# Patient Record
Sex: FEMALE | Race: WHITE | Employment: UNEMPLOYED | URBAN - METROPOLITAN AREA
[De-identification: names, ages, dates, MRNs, and addresses within clinical notes are randomized per-mention and may not be internally consistent; named-entity substitution may affect disease eponyms.]

---

## 2021-05-26 PROBLEM — N93.9 ABNORMAL UTERINE BLEEDING DUE TO ENDOMETRIAL POLYP: Status: ACTIVE | Noted: 2021-05-26

## 2021-05-26 PROBLEM — N84.0 ABNORMAL UTERINE BLEEDING DUE TO ENDOMETRIAL POLYP: Status: ACTIVE | Noted: 2021-05-26

## 2021-12-20 PROBLEM — L72.3 SEBACEOUS CYST: Status: ACTIVE | Noted: 2021-12-20

## 2023-07-31 ENCOUNTER — APPOINTMENT (OUTPATIENT)
Dept: GENERAL RADIOLOGY | Age: 58
End: 2023-07-31
Payer: MEDICAID

## 2023-07-31 ENCOUNTER — HOSPITAL ENCOUNTER (EMERGENCY)
Age: 58
Discharge: HOME | End: 2023-08-01
Attending: STUDENT IN AN ORGANIZED HEALTH CARE EDUCATION/TRAINING PROGRAM
Payer: MEDICAID

## 2023-07-31 VITALS
OXYGEN SATURATION: 97 % | WEIGHT: 163 LBS | SYSTOLIC BLOOD PRESSURE: 148 MMHG | HEIGHT: 66 IN | TEMPERATURE: 98.1 F | BODY MASS INDEX: 26.2 KG/M2 | DIASTOLIC BLOOD PRESSURE: 82 MMHG | HEART RATE: 98 BPM | RESPIRATION RATE: 14 BRPM

## 2023-07-31 DIAGNOSIS — S82.122A CLOSED FRACTURE OF LATERAL PORTION OF LEFT TIBIAL PLATEAU, INITIAL ENCOUNTER: Primary | ICD-10-CM

## 2023-07-31 PROCEDURE — 6360000002 HC RX W HCPCS: Performed by: STUDENT IN AN ORGANIZED HEALTH CARE EDUCATION/TRAINING PROGRAM

## 2023-07-31 PROCEDURE — 99284 EMERGENCY DEPT VISIT MOD MDM: CPT

## 2023-07-31 PROCEDURE — 96372 THER/PROPH/DIAG INJ SC/IM: CPT

## 2023-07-31 PROCEDURE — 73562 X-RAY EXAM OF KNEE 3: CPT

## 2023-07-31 RX ORDER — KETOROLAC TROMETHAMINE 30 MG/ML
15 INJECTION, SOLUTION INTRAMUSCULAR; INTRAVENOUS ONCE
Status: COMPLETED | OUTPATIENT
Start: 2023-07-31 | End: 2023-07-31

## 2023-07-31 RX ADMIN — KETOROLAC TROMETHAMINE 15 MG: 30 INJECTION, SOLUTION INTRAMUSCULAR; INTRAVENOUS at 23:01

## 2023-07-31 ASSESSMENT — PAIN DESCRIPTION - LOCATION: LOCATION: KNEE

## 2023-07-31 ASSESSMENT — PAIN - FUNCTIONAL ASSESSMENT
PAIN_FUNCTIONAL_ASSESSMENT: PREVENTS OR INTERFERES SOME ACTIVE ACTIVITIES AND ADLS
PAIN_FUNCTIONAL_ASSESSMENT: 0-10

## 2023-07-31 ASSESSMENT — PAIN DESCRIPTION - ORIENTATION: ORIENTATION: LEFT

## 2023-07-31 ASSESSMENT — PAIN DESCRIPTION - PAIN TYPE: TYPE: ACUTE PAIN

## 2023-07-31 ASSESSMENT — PAIN SCALES - GENERAL
PAINLEVEL_OUTOF10: 6
PAINLEVEL_OUTOF10: 6

## 2023-07-31 ASSESSMENT — PAIN DESCRIPTION - ONSET: ONSET: SUDDEN

## 2023-07-31 ASSESSMENT — PAIN DESCRIPTION - FREQUENCY: FREQUENCY: CONTINUOUS

## 2023-08-01 NOTE — ED PROVIDER NOTES
Course as of 08/01/23 0003   Mon Jul 31, 2023   6347 Patient is reassessed she remains medically stable. Her pain is improved medications given here. Her x-ray reveals a lateral plateau fracture that appears nondisplaced. Given this finding she is placed in a knee immobilizer instructed on crutches use and directed to be nonweightbearing until she can obtain prompt outpatient follow-up to orthopedics in the next 1 to 2 days. She is instructed on conservative care at home regarding findings of the splint and control and rest ice compression elevation. She is agreeable to this plan and this concludes her ED course [NG]      ED Course User Index  [NG] Alfred Rangel MD       Additional Reassessment    Is this patient to be included in the SEP-1 core measure? No Exclusion criteria - the patient is NOT to be included for SEP-1 Core Measure due to: Infection is not suspected    Medical Decision Making  Fall with left knee injury with nondisplaced lateral tibial plateau fracture placed in knee immobilizer instructed to be nonweightbearing with prompt outpatient follow to orthopedics    Amount and/or Complexity of Data Reviewed  Radiology: ordered. Decision-making details documented in ED Course. Risk  Prescription drug management. Decision regarding hospitalization. The patient was given the followingmedications:  Orders Placed This Encounter   Medications    ketorolac (TORADOL) injection 15 mg       CONSULTS:  None      CRITICAL CARE TIME   None    Clinical Impression     1.  Closed fracture of lateral portion of left tibial plateau, initial encounter        Disposition     PATIENT REFERRED TO:  [address] [place] [address]  Schedule an appointment as soon as possible for a visit in 2 days        DISCHARGE MEDICATIONS:  New Prescriptions    No medications on file       DISPOSITION Decision To Discharge 07/31/2023 10:44:40 PM      Ashley Saha MD

## 2023-08-01 NOTE — DISCHARGE INSTRUCTIONS
You were evaluated in the emergency department for left knee injury. Assessments and testing completed during your visit were reassuring and at this time there is no indication for further testing, treatment or admission to the hospital. Given this it is appropriate to discharge you from the emergency department. At the time of discharge we discussed the following: You have a fracture of a portion of your tibia which is one of the lower leg bones at the level of the knee. This injury may require surgery. You must keep the knee in the splint and place no weight on the injured leg until you visit with the orthopedic surgeons with the referral included here. Please use the crutches to assist getting around so as avoid placing any weight on the injury. You may use the over-the-counter pain reliever of choice including Tylenol or ibuprofen and as we discussed I recommend you elevate the injury apply ice and again leave the splint in place until evaluated by orthopedics. Please note that sometimes it is difficult to diagnose a medical condition early in the disease process before the disease is fully manifest. Because of this, should you develop any new or worsening symptoms, you may return at any time to the emergency department for another evaluation. If available you are also recommended to review this visit with your primary care physician or other medical provider in the next 7 days. Thank you for allowing us to care for you today.

## 2023-08-02 ENCOUNTER — TELEPHONE (OUTPATIENT)
Dept: ORTHOPEDIC SURGERY | Age: 58
End: 2023-08-02

## 2023-08-02 ENCOUNTER — ANESTHESIA EVENT (OUTPATIENT)
Dept: OPERATING ROOM | Age: 58
End: 2023-08-02
Payer: MEDICAID

## 2023-08-02 ENCOUNTER — OFFICE VISIT (OUTPATIENT)
Dept: ORTHOPEDIC SURGERY | Age: 58
End: 2023-08-02

## 2023-08-02 VITALS — BODY MASS INDEX: 26.2 KG/M2 | WEIGHT: 163 LBS | HEIGHT: 66 IN

## 2023-08-02 DIAGNOSIS — M25.562 ACUTE PAIN OF LEFT KNEE: ICD-10-CM

## 2023-08-02 DIAGNOSIS — S82.142A CLOSED FRACTURE OF LEFT TIBIAL PLATEAU, INITIAL ENCOUNTER: Primary | ICD-10-CM

## 2023-08-02 NOTE — TELEPHONE ENCOUNTER
General Question     Subject: LEFT KNEE       Patient and /or Facility Request: Jacques Arciniega  Contact Number: [phone]    PATIENT CALLING REQUESTING A CALL BACK FROM SOMEONE IN DOCTOR SHARIF'S OFFICE REGARDING HER LEFT KNEE SURGERY       PLEASE CALL THE PATIENT BACK AT THE ABOVE NUMBER

## 2023-08-02 NOTE — H&P
approximately 10 weeks to full weightbearing as tolerated. She was transitioned to a T scope hinged knee brace locked in extension for the time being.     Ping Josue MD

## 2023-08-02 NOTE — PROGRESS NOTES
1.  Do not eat or drink anything after 12 midnight prior to surgery. This includes no water, chewing gum or mints. 2.  Take the following pills with a small sip of water on the morning of surgery. 3.  Aspirin, Ibuprofen, Advil, Naproxen, Vitamin E and other Anti-inflammatory products should be stopped for 5 days before surgery or as directed by your physician. 4.  Check with your doctor regarding stopping Plavix, Coumadin, Lovenox, Fragmin or other blood thinners. 5.  Do not smoke and do not drink alcoholic beverages 24 hours prior to surgery. This includes NA Beer. 6.  You may brush your teeth and gargle the morning of surgery. DO NOT SWALLOW WATER.  7.  You MUST make arrangements for a responsible adult to take you home after your surgery. You will not be allowed to leave alone or drive yourself home. It is strongly suggested someone stay with you the first 24 hours. Your surgery will be cancelled if you do not have a ride home. 8.  A parent/legal guardian must accompany a child scheduled for surgery and plan to stay at the hospital until the child is discharged. Please do not bring other children with you. 9.  Please wear simple, loose fitting clothing to the hospital.  [place] not bring valuables ( money, credit cards, checkbooks, etc.)  Do not wear any makeup (including no eye makeup) or nail polish on your fingers or toes. 10.  Do not wear any jewelry or piercing on the day of surgery. All body piercing jewelry must be removed. 11.  If you have dentures, they will be removed before going to the OR; we will provide you a container. If you wear contact lenses or glasses, they will be removed; please bring a case for them. 12.  Please see your family doctor/pediatrician for a history & physical and/or concerning medications. Bring any test results/reports from your physician's office the day of surgery. 15.  Remember to bring Blood Bank Bracelet to the hospital on the day of surgery.   14.  If you have a Living Will and Durable Power of  for Healthcare, please bring in a copy. 13.  Notify your Surgeon if you develop any illness between now and surgery time; cough, cold, fever, sore throat, nausea, vomiting, etc.  Please notify your surgeon if you experience dizziness, shortness of breath or blurred vision between now and the time of your surgery. 16.  DO NOT shave your operative site 96 hours (4 days) prior to surgery. For face and neck surgery, men may use an electric razor 48 hours (2 days) prior to surgery. 17. Shower the night before surgery and the morning of surgery with  an antibacterial soap   or  Chlorhexidine gluconate (for total joint replacement). To provide excellent care, visitors will be limited to two in a room at any given time. Please no children under the age of 15 in the surgical department.

## 2023-08-02 NOTE — H&P (VIEW-ONLY)
ORTHOPAEDIC SURGERY INITIAL EVALUATION NOTE  Chief Complaint   Patient presents with    Knee Pain     L Tibial Plateau Fx      HISTORY OF PRESENT ILLNESS:  49-year-old female presents for evaluation of a left knee injury. She sustained a injury to the left knee after slipping on a toy at home. She describes the bending type moment to her left knee. She indicates that she was unable to walk and presented to the emergency department at [facility].  Her date of injury was 7/31/2023. She rates her pain 4 out of 10. She has been getting around with the knee immobilizer and crutches. She has significant pain with movement. She denies numbness or tingling. She indicates there was minimal osteoarthritis pain involving the knee previous to the injury. 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

## 2023-08-03 ENCOUNTER — ANESTHESIA (OUTPATIENT)
Dept: OPERATING ROOM | Age: 58
End: 2023-08-03
Payer: MEDICAID

## 2023-08-03 ENCOUNTER — HOSPITAL ENCOUNTER (OUTPATIENT)
Age: 58
Setting detail: OUTPATIENT SURGERY
Discharge: HOME | End: 2023-08-03
Attending: ORTHOPAEDIC SURGERY | Admitting: ORTHOPAEDIC SURGERY
Payer: MEDICAID

## 2023-08-03 VITALS
DIASTOLIC BLOOD PRESSURE: 85 MMHG | WEIGHT: 163 LBS | HEIGHT: 66 IN | TEMPERATURE: 97 F | RESPIRATION RATE: 16 BRPM | BODY MASS INDEX: 26.2 KG/M2 | OXYGEN SATURATION: 97 % | HEART RATE: 91 BPM | SYSTOLIC BLOOD PRESSURE: 141 MMHG

## 2023-08-03 DIAGNOSIS — S82.142A CLOSED FRACTURE OF LEFT TIBIAL PLATEAU, INITIAL ENCOUNTER: Primary | ICD-10-CM

## 2023-08-03 PROCEDURE — 3600000012 HC SURGERY LEVEL 2 ADDTL 15MIN: Performed by: ORTHOPAEDIC SURGERY

## 2023-08-03 PROCEDURE — 6360000002 HC RX W HCPCS: Performed by: FAMILY MEDICINE

## 2023-08-03 PROCEDURE — 6370000000 HC RX 637 (ALT 250 FOR IP)

## 2023-08-03 PROCEDURE — 7100000000 HC PACU RECOVERY - FIRST 15 MIN: Performed by: ORTHOPAEDIC SURGERY

## 2023-08-03 PROCEDURE — 3700000000 HC ANESTHESIA ATTENDED CARE: Performed by: ORTHOPAEDIC SURGERY

## 2023-08-03 PROCEDURE — 3700000001 HC ADD 15 MINUTES (ANESTHESIA): Performed by: ORTHOPAEDIC SURGERY

## 2023-08-03 PROCEDURE — 2720000010 HC SURG SUPPLY STERILE: Performed by: ORTHOPAEDIC SURGERY

## 2023-08-03 PROCEDURE — C9399 UNCLASSIFIED DRUGS OR BIOLOG: HCPCS | Performed by: NURSE ANESTHETIST, CERTIFIED REGISTERED

## 2023-08-03 PROCEDURE — 7100000011 HC PHASE II RECOVERY - ADDTL 15 MIN: Performed by: ORTHOPAEDIC SURGERY

## 2023-08-03 PROCEDURE — 2500000003 HC RX 250 WO HCPCS: Performed by: FAMILY MEDICINE

## 2023-08-03 PROCEDURE — 2580000003 HC RX 258: Performed by: ANESTHESIOLOGY

## 2023-08-03 PROCEDURE — 2709999900 HC NON-CHARGEABLE SUPPLY: Performed by: ORTHOPAEDIC SURGERY

## 2023-08-03 PROCEDURE — 6360000002 HC RX W HCPCS: Performed by: NURSE ANESTHETIST, CERTIFIED REGISTERED

## 2023-08-03 PROCEDURE — 3600000002 HC SURGERY LEVEL 2 BASE: Performed by: ORTHOPAEDIC SURGERY

## 2023-08-03 PROCEDURE — 7100000001 HC PACU RECOVERY - ADDTL 15 MIN: Performed by: ORTHOPAEDIC SURGERY

## 2023-08-03 PROCEDURE — 6360000002 HC RX W HCPCS: Performed by: ORTHOPAEDIC SURGERY

## 2023-08-03 PROCEDURE — C1713 ANCHOR/SCREW BN/BN,TIS/BN: HCPCS | Performed by: ORTHOPAEDIC SURGERY

## 2023-08-03 PROCEDURE — 7100000010 HC PHASE II RECOVERY - FIRST 15 MIN: Performed by: ORTHOPAEDIC SURGERY

## 2023-08-03 DEVICE — IMPLANTABLE DEVICE: Type: IMPLANTABLE DEVICE | Site: TIBIA | Status: FUNCTIONAL

## 2023-08-03 RX ORDER — SODIUM CHLORIDE, SODIUM LACTATE, POTASSIUM CHLORIDE, CALCIUM CHLORIDE 600; 310; 30; 20 MG/100ML; MG/100ML; MG/100ML; MG/100ML
INJECTION, SOLUTION INTRAVENOUS CONTINUOUS
Status: DISCONTINUED | OUTPATIENT
Start: 2023-08-03 | End: 2023-08-03 | Stop reason: HOSPADM

## 2023-08-03 RX ORDER — ONDANSETRON 2 MG/ML
4 INJECTION INTRAMUSCULAR; INTRAVENOUS
Status: DISCONTINUED | OUTPATIENT
Start: 2023-08-03 | End: 2023-08-03 | Stop reason: HOSPADM

## 2023-08-03 RX ORDER — ROCURONIUM BROMIDE 10 MG/ML
INJECTION, SOLUTION INTRAVENOUS PRN
Status: DISCONTINUED | OUTPATIENT
Start: 2023-08-03 | End: 2023-08-03

## 2023-08-03 RX ORDER — ROCURONIUM BROMIDE 10 MG/ML
INJECTION, SOLUTION INTRAVENOUS PRN
Status: DISCONTINUED | OUTPATIENT
Start: 2023-08-03 | End: 2023-08-03 | Stop reason: SDUPTHER

## 2023-08-03 RX ORDER — SODIUM CHLORIDE 0.9 % (FLUSH) 0.9 %
5-40 SYRINGE (ML) INJECTION EVERY 12 HOURS SCHEDULED
Status: DISCONTINUED | OUTPATIENT
Start: 2023-08-03 | End: 2023-08-03 | Stop reason: HOSPADM

## 2023-08-03 RX ORDER — FENTANYL CITRATE 50 UG/ML
INJECTION, SOLUTION INTRAMUSCULAR; INTRAVENOUS PRN
Status: DISCONTINUED | OUTPATIENT
Start: 2023-08-03 | End: 2023-08-03 | Stop reason: SDUPTHER

## 2023-08-03 RX ORDER — OXYCODONE HYDROCHLORIDE AND ACETAMINOPHEN 5; 325 MG/1; MG/1
1 TABLET ORAL EVERY 6 HOURS PRN
Qty: 28 TABLET | Refills: 0 | Status: SHIPPED | OUTPATIENT
Start: 2023-08-03 | End: 2023-08-10

## 2023-08-03 RX ORDER — ONDANSETRON 2 MG/ML
INJECTION INTRAMUSCULAR; INTRAVENOUS PRN
Status: DISCONTINUED | OUTPATIENT
Start: 2023-08-03 | End: 2023-08-03 | Stop reason: SDUPTHER

## 2023-08-03 RX ORDER — DIPHENHYDRAMINE HYDROCHLORIDE 50 MG/ML
12.5 INJECTION INTRAMUSCULAR; INTRAVENOUS
Status: DISCONTINUED | OUTPATIENT
Start: 2023-08-03 | End: 2023-08-03 | Stop reason: HOSPADM

## 2023-08-03 RX ORDER — BUPIVACAINE HYDROCHLORIDE 5 MG/ML
INJECTION, SOLUTION PERINEURAL PRN
Status: DISCONTINUED | OUTPATIENT
Start: 2023-08-03 | End: 2023-08-03 | Stop reason: ALTCHOICE

## 2023-08-03 RX ORDER — CEFAZOLIN SODIUM 1 G/3ML
INJECTION, POWDER, FOR SOLUTION INTRAMUSCULAR; INTRAVENOUS PRN
Status: DISCONTINUED | OUTPATIENT
Start: 2023-08-03 | End: 2023-08-03 | Stop reason: SDUPTHER

## 2023-08-03 RX ORDER — CEFAZOLIN 2 G/1
INJECTION, POWDER, FOR SOLUTION INTRAMUSCULAR; INTRAVENOUS
Status: COMPLETED
Start: 2023-08-03 | End: 2023-08-03

## 2023-08-03 RX ORDER — DIPHENHYDRAMINE HYDROCHLORIDE 50 MG/ML
INJECTION INTRAMUSCULAR; INTRAVENOUS PRN
Status: DISCONTINUED | OUTPATIENT
Start: 2023-08-03 | End: 2023-08-03 | Stop reason: SDUPTHER

## 2023-08-03 RX ORDER — FENTANYL CITRATE 50 UG/ML
25 INJECTION, SOLUTION INTRAMUSCULAR; INTRAVENOUS EVERY 5 MIN PRN
Status: DISCONTINUED | OUTPATIENT
Start: 2023-08-03 | End: 2023-08-03 | Stop reason: HOSPADM

## 2023-08-03 RX ORDER — OXYCODONE HYDROCHLORIDE 5 MG/1
TABLET ORAL
Status: COMPLETED
Start: 2023-08-03 | End: 2023-08-03

## 2023-08-03 RX ORDER — IBUPROFEN 200 MG
200 TABLET ORAL EVERY 6 HOURS PRN
COMMUNITY

## 2023-08-03 RX ORDER — IPRATROPIUM BROMIDE AND ALBUTEROL SULFATE 2.5; .5 MG/3ML; MG/3ML
1 SOLUTION RESPIRATORY (INHALATION)
Status: DISCONTINUED | OUTPATIENT
Start: 2023-08-03 | End: 2023-08-03 | Stop reason: HOSPADM

## 2023-08-03 RX ORDER — SODIUM CHLORIDE 9 MG/ML
INJECTION, SOLUTION INTRAVENOUS PRN
Status: DISCONTINUED | OUTPATIENT
Start: 2023-08-03 | End: 2023-08-03 | Stop reason: HOSPADM

## 2023-08-03 RX ORDER — SODIUM CHLORIDE 9 MG/ML
INJECTION, SOLUTION INTRAVENOUS
Status: DISCONTINUED
Start: 2023-08-03 | End: 2023-08-03 | Stop reason: HOSPADM

## 2023-08-03 RX ORDER — LABETALOL HYDROCHLORIDE 5 MG/ML
10 INJECTION, SOLUTION INTRAVENOUS
Status: DISCONTINUED | OUTPATIENT
Start: 2023-08-03 | End: 2023-08-03 | Stop reason: HOSPADM

## 2023-08-03 RX ORDER — MEPERIDINE HYDROCHLORIDE 25 MG/ML
12.5 INJECTION INTRAMUSCULAR; INTRAVENOUS; SUBCUTANEOUS EVERY 5 MIN PRN
Status: DISCONTINUED | OUTPATIENT
Start: 2023-08-03 | End: 2023-08-03 | Stop reason: HOSPADM

## 2023-08-03 RX ORDER — OXYCODONE HYDROCHLORIDE AND ACETAMINOPHEN 5; 325 MG/1; MG/1
1 TABLET ORAL EVERY 4 HOURS PRN
Status: DISCONTINUED | OUTPATIENT
Start: 2023-08-03 | End: 2023-08-03 | Stop reason: HOSPADM

## 2023-08-03 RX ORDER — KETOROLAC TROMETHAMINE 30 MG/ML
INJECTION, SOLUTION INTRAMUSCULAR; INTRAVENOUS PRN
Status: DISCONTINUED | OUTPATIENT
Start: 2023-08-03 | End: 2023-08-03 | Stop reason: SDUPTHER

## 2023-08-03 RX ORDER — SODIUM CHLORIDE 0.9 % (FLUSH) 0.9 %
5-40 SYRINGE (ML) INJECTION PRN
Status: DISCONTINUED | OUTPATIENT
Start: 2023-08-03 | End: 2023-08-03 | Stop reason: HOSPADM

## 2023-08-03 RX ORDER — LIDOCAINE HYDROCHLORIDE 20 MG/ML
INJECTION, SOLUTION EPIDURAL; INFILTRATION; INTRACAUDAL; PERINEURAL PRN
Status: DISCONTINUED | OUTPATIENT
Start: 2023-08-03 | End: 2023-08-03 | Stop reason: SDUPTHER

## 2023-08-03 RX ORDER — MIDAZOLAM HYDROCHLORIDE 1 MG/ML
INJECTION INTRAMUSCULAR; INTRAVENOUS PRN
Status: DISCONTINUED | OUTPATIENT
Start: 2023-08-03 | End: 2023-08-03 | Stop reason: SDUPTHER

## 2023-08-03 RX ORDER — DEXAMETHASONE SODIUM PHOSPHATE 4 MG/ML
INJECTION, SOLUTION INTRA-ARTICULAR; INTRALESIONAL; INTRAMUSCULAR; INTRAVENOUS; SOFT TISSUE PRN
Status: DISCONTINUED | OUTPATIENT
Start: 2023-08-03 | End: 2023-08-03 | Stop reason: SDUPTHER

## 2023-08-03 RX ORDER — PROPOFOL 10 MG/ML
INJECTION, EMULSION INTRAVENOUS PRN
Status: DISCONTINUED | OUTPATIENT
Start: 2023-08-03 | End: 2023-08-03 | Stop reason: SDUPTHER

## 2023-08-03 RX ORDER — PROCHLORPERAZINE EDISYLATE 5 MG/ML
5 INJECTION INTRAMUSCULAR; INTRAVENOUS
Status: DISCONTINUED | OUTPATIENT
Start: 2023-08-03 | End: 2023-08-03 | Stop reason: HOSPADM

## 2023-08-03 RX ORDER — SUCCINYLCHOLINE CHLORIDE 20 MG/ML
INJECTION INTRAMUSCULAR; INTRAVENOUS PRN
Status: DISCONTINUED | OUTPATIENT
Start: 2023-08-03 | End: 2023-08-03 | Stop reason: SDUPTHER

## 2023-08-03 RX ORDER — BUPIVACAINE HYDROCHLORIDE 5 MG/ML
INJECTION, SOLUTION EPIDURAL; INTRACAUDAL
Status: DISCONTINUED
Start: 2023-08-03 | End: 2023-08-03 | Stop reason: HOSPADM

## 2023-08-03 RX ORDER — LORAZEPAM 2 MG/ML
0.5 INJECTION INTRAMUSCULAR
Status: DISCONTINUED | OUTPATIENT
Start: 2023-08-03 | End: 2023-08-03 | Stop reason: RX

## 2023-08-03 RX ADMIN — SUGAMMADEX 200 MG: 100 INJECTION, SOLUTION INTRAVENOUS at 11:50

## 2023-08-03 RX ADMIN — OXYCODONE HYDROCHLORIDE 5 MG: 5 TABLET ORAL at 12:53

## 2023-08-03 RX ADMIN — LIDOCAINE HYDROCHLORIDE 100 MG: 20 INJECTION, SOLUTION EPIDURAL; INFILTRATION; INTRACAUDAL; PERINEURAL at 10:19

## 2023-08-03 RX ADMIN — SODIUM CHLORIDE, POTASSIUM CHLORIDE, SODIUM LACTATE AND CALCIUM CHLORIDE: 600; 310; 30; 20 INJECTION, SOLUTION INTRAVENOUS at 08:37

## 2023-08-03 RX ADMIN — DIPHENHYDRAMINE HYDROCHLORIDE 25 MG: 50 INJECTION, SOLUTION INTRAMUSCULAR; INTRAVENOUS at 12:13

## 2023-08-03 RX ADMIN — SUCCINYLCHOLINE CHLORIDE 120 MG: 20 INJECTION, SOLUTION INTRAMUSCULAR; INTRAVENOUS at 10:23

## 2023-08-03 RX ADMIN — KETOROLAC TROMETHAMINE 30 MG: 30 INJECTION, SOLUTION INTRAMUSCULAR; INTRAVENOUS at 11:41

## 2023-08-03 RX ADMIN — FENTANYL CITRATE 50 MCG: 50 INJECTION INTRAMUSCULAR; INTRAVENOUS at 10:23

## 2023-08-03 RX ADMIN — PROPOFOL 150 MG: 10 INJECTION, EMULSION INTRAVENOUS at 10:22

## 2023-08-03 RX ADMIN — MIDAZOLAM 2 MG: 1 INJECTION INTRAMUSCULAR; INTRAVENOUS at 10:14

## 2023-08-03 RX ADMIN — ONDANSETRON 4 MG: 2 INJECTION INTRAMUSCULAR; INTRAVENOUS at 10:29

## 2023-08-03 RX ADMIN — ONDANSETRON 4 MG: 2 INJECTION INTRAMUSCULAR; INTRAVENOUS at 12:18

## 2023-08-03 RX ADMIN — DEXAMETHASONE SODIUM PHOSPHATE 4 MG: 4 INJECTION, SOLUTION INTRAMUSCULAR; INTRAVENOUS at 10:29

## 2023-08-03 RX ADMIN — CEFAZOLIN 2 G: 330 INJECTION, POWDER, FOR SOLUTION INTRAMUSCULAR; INTRAVENOUS at 10:14

## 2023-08-03 RX ADMIN — ROCURONIUM BROMIDE 50 MG: 10 INJECTION INTRAVENOUS at 10:29

## 2023-08-03 RX ADMIN — HYDROMORPHONE HYDROCHLORIDE 0.5 MG: 1 INJECTION, SOLUTION INTRAMUSCULAR; INTRAVENOUS; SUBCUTANEOUS at 10:40

## 2023-08-03 RX ADMIN — FENTANYL CITRATE 50 MCG: 50 INJECTION INTRAMUSCULAR; INTRAVENOUS at 10:29

## 2023-08-03 ASSESSMENT — PAIN SCALES - GENERAL
PAINLEVEL_OUTOF10: 2
PAINLEVEL_OUTOF10: 5
PAINLEVEL_OUTOF10: 5
PAINLEVEL_OUTOF10: 4

## 2023-08-03 ASSESSMENT — PAIN DESCRIPTION - LOCATION: LOCATION: LEG

## 2023-08-03 ASSESSMENT — PAIN DESCRIPTION - DESCRIPTORS
DESCRIPTORS: ACHING
DESCRIPTORS: DULL

## 2023-08-03 ASSESSMENT — PAIN DESCRIPTION - ORIENTATION: ORIENTATION: LEFT

## 2023-08-03 NOTE — PROGRESS NOTES
Pt states she is still having some dizziness. Also having pain rated 5-7. She states it is hard to number. She does not want IV pain med due to it affecting her brain. She will take oral pain meds. Pt is tolerating drinking fluids and eating crackers.

## 2023-08-03 NOTE — DISCHARGE INSTRUCTIONS
Discharge instructions  No weightbearing on the left leg. Use walker/crutches to assist.  Use pain as a guide to activity. Hinged knee brace locked in extension with exception of skin rest and hygiene. Knee brace locked straight in full extension for the first 2 weeks. Use ice to limit swelling. Take pain medications as prescribed. Leave dressing in place x 7 days postop. Apply new mepilex dressing at that time. May change sooner if saturation or moisture. May shower with dressing in place. Allow water to run over dressing. No tub baths. Do not submerge. Recommend taking a aspirin 81mg twice daily for 6 weeks to prevent blood clots. Look out for worsening pain, increasing redness, fevers/chills, numbness, chest pain, shortness of breath. Call the office at [phone] if you have questions/concerns. Followup in 2 weeks for wound check. ANESTHESIA DISCHARGE INSTRUCTIONS    You are under the influence of drugs- do not drink alcohol, drive a car, operate machinery(such as power tools, kitchen appliances, etc), sign legal documents, or make any important decisions for 24 hours (or while on pain medications). Children should not ride bikes or [place] or play on gym sets  for 24 hours after surgery. A responsible adult should be with you for 24 hours. Rest at home today- increase activity as tolerated. Progress slowly to a regular diet unless your physician has instructed you otherwise. Drink plenty of water. CALL YOUR DOCTOR IF YOU:  Have moderate to severe nausea or vomiting AND are unable to hold down fluids or prescribed medications. Have bright red bloody drainage from your dressing that won't stop oozing.   Do not get relief with your pain medication    NORMAL (POSSIBLE) SIDE EFFECTS FROM ANESTHESIA:     Confusion, temporary memory loss, delayed reaction times in the first 24 hours  Lightheadedness, dizziness, difficulty focusing, blurred vision  Nausea/vomiting can happen  Shivering,

## 2023-08-03 NOTE — INTERVAL H&P NOTE
Update History & Physical    The patient's History and Physical of August 2, 2023 was reviewed with the patient and I examined the patient. There was no change. The surgical site was confirmed by the patient and me. Plan: The risks, benefits, expected outcome, and alternative to the recommended procedure have been discussed with the patient. Patient understands and wants to proceed with the procedure.      Electronically signed by Radha Bingham MD on 8/3/2023 at 10:08 AM

## 2023-08-03 NOTE — PROGRESS NOTES
Pt is tolerating ice chips. She said her head is dizzy. Hard to focus right now. Also she has some pain. Nurse will not medicate with Dilaudid yet due to patient saying her her is dizzy. Pt is tolerating the pain and will wait a bit for pain med. Cold washcloth placed on forehead.

## 2023-08-03 NOTE — OP NOTE
Operative Note      Patient: Salome Painting  YOB: 1965  MRN: [id number]    Date of Procedure: 8/3/2023    Pre-Op Diagnosis Codes:     * Fracture, tibial plateau, left, closed, initial encounter [S82.142A]    Post-Op Diagnosis: Same       Procedure(s):  OPEN REDUCTION INTERNAL FIXATION OF LEFT TIBIAL PLATEAU FRACTURE    Surgeon(s):  Cass Cody MD    Assistant:   First Assistant: Bipin Anglin    Anesthesia: General    Estimated Blood Loss (mL): less than 580     Complications: None    Specimens:   * No specimens in log *    Implants:  Implant Name Type Inv. Item Serial No.  Lot No. LRB No. Used Action   GRAFT SYNTH TISS PTTY 2.5 CC MOLD KT GAMMA-BSM - [id number]  GRAFT SYNTH TISS PTTY 2.5 CC MOLD KT GAMMA-BSM  [employer]DIA [employer] TRAUMA-WD [id number] Left 2 Implanted   PLATE BONE 3 H LT PROX TIB HELENE STD CRV LCK ALPS - [id number]  PLATE BONE 3 H LT PROX TIB HELENE STD CRV LCK ALPS  [employer]DIA [employer] TRAU[employer]-WD  Left 1 Implanted   SCREW BNE L60MM DIA3. 5MM STD PATTY DST TIB TI ST YULISSA FULL - [id number]  SCREW BNE L60MM DIA3. 5MM STD PATTY DST TIB TI ST YULISSA FULL  NDAIA [employer]OMET TRAUMA-WD  Left 3 Implanted   SCREW BNE L65MM DIA3. 5MM STD PATTY DST TIB TI ST YULISSA FULL - [id number]  SCREW BNE L65MM DIA3. 5MM STD PATTY DST TIB TI ST YULISSA FULL  NADIA [employer]OMET TRAUMA-WD  Left 2 Implanted   SCREW BNE L50MM DIA3. 5MM STD PATTY TI ST NONCANNULATED FULL - [id number]  SCREW BNE L50MM DIA3. 5MM STD PATTY TI ST NONCANNULATED FULL  NADIA BIOMET TRAUMA-WD  Left 1 Implanted   SCREW BNE L46MM DIA3.5MM PATTY ST NONCANNULATED NONLOCKING - [id number]  SCREW BNE L46MM DIA3.5MM PATTY ST NONCANNULATED NONLOCKING  NADIA BIOMET TRAUMA-WD  Left 1 Implanted     Tourniquet time: Less than 1 hour at 300 mmHg on the left side.     Drains: * No LDAs found *    Findings: Significant subsidence of dye punch type lateral tibial plateau osteochondral fragments    Detailed Description of Procedure:   Patient was positively identified in the holding area by

## 2023-08-03 NOTE — ANESTHESIA POSTPROCEDURE EVALUATION
Department of Anesthesiology  Postprocedure Note    Patient: Anjelica Veloz  MRN: [id number]  YOB: 1965  Date of evaluation: 8/3/2023      Procedure Summary     Date: 08/03/23 Room / Location: [address] [place] [address] OR 01 / [facility]    Anesthesia Start: 8360 Anesthesia Stop: 6541    Procedure: OPEN REDUCTION INTERNAL FIXATION OF LEFT TIBIAL PLATEAU FRACTURE (Left: Leg Lower) Diagnosis:       Fracture, tibial plateau, left, closed, initial encounter      (Fracture, tibial plateau, left, closed, initial encounter [R39.888L])    Surgeons: Yolanda Simons MD Responsible Provider: Alea Cardenas MD    Anesthesia Type: general ASA Status: 2          Anesthesia Type: No value filed.     Nellie Phase I: Nellie Score: 10    Nellie Phase II:        Anesthesia Post Evaluation    Patient location during evaluation: PACU  Level of consciousness: awake  Complications: no  Multimodal analgesia pain management approach

## 2023-08-03 NOTE — ADDENDUM NOTE
Addendum  created 08/03/23 1307 by Liyah Quiroga MD    Order list changed, Pharmacy for encounter modified

## 2023-08-03 NOTE — PROGRESS NOTES
Discharge  instructions reviewed. Pt and family verbalize understanding with no further questions. VSS. Pt discharged via [facility] to car. Assessment unchanged.

## 2023-08-03 NOTE — PROGRESS NOTES
Pt is awake and alert. States she is nauseated. SHREYA Parada will get the Zofran to give her. Pt is asking for ice chips.

## 2023-08-10 ENCOUNTER — TELEPHONE (OUTPATIENT)
Dept: ORTHOPEDIC SURGERY | Age: 58
End: 2023-08-10

## 2023-08-10 NOTE — TELEPHONE ENCOUNTER
Other PATIENT CALLED STATES THAT SHE NEEDS TO SPEAK WITH SOMEONE TO CLARIFY HER INSTRUCTIONS FOR POST OP SHOWERING.  PLS CALL TO [facility] [phone]

## 2023-08-10 NOTE — TELEPHONE ENCOUNTER
Spoke with patient. She wanted to clarify that the Meplilex could get wet. I informed her that it is waterproof and reminded her not to submerge her leg at this point. She will see us next Wednesday for a post-op check.

## 2023-08-16 ENCOUNTER — OFFICE VISIT (OUTPATIENT)
Dept: ORTHOPEDIC SURGERY | Age: 58
End: 2023-08-16

## 2023-08-16 VITALS — HEIGHT: 66 IN | WEIGHT: 163 LBS | BODY MASS INDEX: 26.2 KG/M2

## 2023-08-16 DIAGNOSIS — Z47.89 ORTHOPEDIC AFTERCARE: Primary | ICD-10-CM

## 2023-08-16 PROCEDURE — 99024 POSTOP FOLLOW-UP VISIT: CPT | Performed by: ORTHOPAEDIC SURGERY

## 2023-08-17 NOTE — PROGRESS NOTES
ORTHOPAEDIC SURGERY FOLLOWUP VISIT    CHIEF COMPLAINT: Left knee:    DATE OF INJURY: 8/1/2023  DIAGNOSIS: Left lateral tibial plateau fracture  DATE OF SURGERY: 8/3/2023, ORIF left lateral tibial plateau fracture    HISTORY OF PRESENT ILLNESS:  66-year-old female presents POD #13 status post ORIF left lateral tibial plateau fracture. This was a die punch type pattern. This was treated by elevating the articular surface and calcium phosphate cement support. Overall, she is doing extremely well. She rates her pain 0 out of 10 today. No issues with wound healing. Denies fever, chills, night sweats. Has not had any wound drainage. She has been compliant with nonweightbearing restrictions and use of her hinged knee brace. She has weaned completely from narcotic pain medications. PHYSICAL EXAM:  General: Well-appearing. No distress. Left lower extremity: Incisional sites are pristinely approximated with Prineo mesh. No signs of dehiscence. No open areas. No surrounding erythema. Prineo mesh was removed today. Underlying incisions are healing well. Steri-Strips applied. Knee range of motion gently assessed is full extension to 60 degrees of flexion related to the anterior knee swelling and stiffness. There is moderate anterior knee swelling. No significant effusion or hemarthrosis. Ligamentous stress exam was not carried out today. Sensation is intact to light touch in deep peroneal, superficial peroneal, tibial, sural, and saphenous nerve distributions. Motor function is intact to EHL, FHL, tibialis anterior, and gastroc. There is brisk capillary refill to the toes and a strong palpable dorsalis pedis pulse. Compartments are soft and compressible. There is no calf tenderness and a negative Homans' sign. RADIOGRAPHIC EXAM:  3 views of the left knee including AP, tunnel, lateral x-rays demonstrate internal fixation hardware in excellent position.   Congruence of the lateral joint space is

## 2023-08-23 ENCOUNTER — HOSPITAL ENCOUNTER (OUTPATIENT)
Dept: PHYSICAL THERAPY | Age: 58
Setting detail: THERAPIES SERIES
Discharge: HOME | End: 2023-08-23
Payer: COMMERCIAL

## 2023-08-23 DIAGNOSIS — R60.1 GENERALIZED EDEMA: ICD-10-CM

## 2023-08-23 DIAGNOSIS — M25.662 KNEE STIFFNESS, LEFT: Primary | ICD-10-CM

## 2023-08-23 DIAGNOSIS — R29.898 LEFT LEG WEAKNESS: ICD-10-CM

## 2023-08-23 DIAGNOSIS — R26.2 DIFFICULTY WALKING: ICD-10-CM

## 2023-08-23 PROCEDURE — 97161 PT EVAL LOW COMPLEX 20 MIN: CPT | Performed by: PHYSICAL THERAPIST

## 2023-08-23 PROCEDURE — 97016 VASOPNEUMATIC DEVICE THERAPY: CPT | Performed by: PHYSICAL THERAPIST

## 2023-08-23 PROCEDURE — 97110 THERAPEUTIC EXERCISES: CPT | Performed by: PHYSICAL THERAPIST

## 2023-08-23 NOTE — FLOWSHEET NOTE
weeks  Independent in HEP and progression per patient tolerance, in order to progress toward full function and prevent re-injury. Status:  [] Progressing: [] Met: [] Not Met: [] Adjusted  Patient will have a decrease in pain to 1/10 to help  facilitate improvement in movement, function, and ADLs as indicated by functional deficits. Status:  [] Progressing: [] Met: [] Not Met: [] Adjusted    Long Term Goals: To be achieved in: 8-10 weeks  Disability index score of 50% or less for the LEFS to assist with return top prior level of function. Status:  [] Progressing: [] Met: [] Not Met: [] Adjusted  Improve knee flexion PROM to 120-130 degrees or better to allow for proper joint functioning as indicated by patients functional deficits. Status:  [] Progressing: [] Met: [] Not Met: [] Adjusted  Pt to improve strength to 4+/5 or better of proximal hip, quadriceps, and hamstringsto allow for proper muscle and joint use in functional mobility, ADLs and prior level of function  Status:  [] Progressing: [] Met: [] Not Met: [] Adjusted  Patient will return to up/down 1 flight of stairs without increased symptoms or restriction to work towards return to prior level of function. Status:  [] Progressing: [] Met: [] Not Met: [] Adjusted  Patient will be able to manage symptoms while resuming full duty at work. Status:  [] Progressing: [] Met: [] Not Met: [] Adjusted        Overall Progression Towards Functional goals/ Treatment Progress Update:  [] Patient is progressing as expected towards functional goals listed. [] Progression is slowed due to complexities/Impairments listed. [] Progression has been slowed due to co-morbidities.   [x] Plan just implemented, too soon (<30days) to assess goals progression   [] Goals require adjustment due to lack of progress  [] Patient is not progressing as expected and requires additional follow up with physician  [] Other:     CHARGE CAPTURE     CHARGE GRID   CPT

## 2023-09-01 ENCOUNTER — HOSPITAL ENCOUNTER (OUTPATIENT)
Dept: PHYSICAL THERAPY | Age: 58
Setting detail: THERAPIES SERIES
Discharge: HOME | End: 2023-09-01
Payer: COMMERCIAL

## 2023-09-01 PROCEDURE — 97016 VASOPNEUMATIC DEVICE THERAPY: CPT | Performed by: PHYSICAL THERAPIST

## 2023-09-01 PROCEDURE — 97110 THERAPEUTIC EXERCISES: CPT | Performed by: PHYSICAL THERAPIST

## 2023-09-01 NOTE — FLOWSHEET NOTE
coordination, kinesthetic sense, posture, and/or proprioception for sitting and/or standing activities  (20513) MANUAL THERAPY-  Manual therapy techniques, 1 or more regions, each 15 minutes (Mobilization/manipulation, manual lymphatic drainage, manual traction) for the purpose of modulating pain, promoting relaxation,  increasing ROM, reducing/eliminating soft tissue swelling/inflammation/restriction, improving soft tissue extensibility and allowing for proper ROM for normal function with self care, mobility, lifting and ambulation  (16454) VASOPNEUMATIC    TREATMENT PLAN   Plan: Progress hip/core exercises as tolerated. Cont NWB status L LE. Electronically Signed by Quentin Rico, ISAAC  Date: 09/01/2023     Note: If patient does not return for scheduled/recommended follow up visits, this note will serve as a discharge from care along with the most recent update on progress.

## 2023-09-06 ENCOUNTER — HOSPITAL ENCOUNTER (OUTPATIENT)
Dept: PHYSICAL THERAPY | Age: 58
Setting detail: THERAPIES SERIES
Discharge: HOME | End: 2023-09-06
Payer: COMMERCIAL

## 2023-09-06 PROCEDURE — 97016 VASOPNEUMATIC DEVICE THERAPY: CPT | Performed by: PHYSICAL THERAPIST

## 2023-09-06 PROCEDURE — 97110 THERAPEUTIC EXERCISES: CPT | Performed by: PHYSICAL THERAPIST

## 2023-09-06 NOTE — FLOWSHEET NOTE
[address] [facility]  [address] office: [phone] fax: [phone]      Physical Therapy: TREATMENT/PROGRESS NOTE   Patient: Arun Madsen (87 y.o. female)   Treatment Date: 2023   :  1965 MRN: [id number]   Visit #: 3    Insurance: Payor: Marleen [place] / Plan: [place] / Product Type: *No Product type* /   Insurance ID: [id number] - (Medicaid Managed)  Secondary Insurance (if applicable):    Treatment Diagnosis:        Knee stiffness, left  M25.662          2. Left leg weakness  R29.898         3. Difficulty walking  R26.2         4. Generalized edema  R60.1        Medical Diagnosis:       Orthopedic aftercare [Z47.89]   Referring Physician: Hiram Guerra MD  PCP: Oleksandr Salas                             Plan of care signed (Y/N):     Date of Patient follow up with Physician: 23     Progress Report: EVAL today    Progress report due (10 Rx/or 30 days whichever is less):      Recertification due (POC duration/ or 90 days whichever is less): 23     Visit # Insurance Allowable Auth Needed   3 30 [x]Yes  after 30   []No     Latex Allergy:  [x]NO      []YES  Preferred Language for Healthcare:   [x]English       []other:    SUBJECTIVE EXAMINATION     Patient Report/Comments: Reports feeling stiffness, better than last time. Using walker at home and compliant with NWB status. No new issues to report. Simran STYLES next week.      OBJECTIVE EXAMINATION     Observation:     Test measurements: see eval     Test used Initial score 2023   Pain Summary VAS 2/10 1/10   Functional questionnaire LEFS 88%    ROM       Resting ext -10 0    Knee flex 85 112         Strength Quad set fair Fair +            After vaso   Girth  Midpat (norm 37.5) 40 cm 40.0    5 cm sup (norm 38) 40.3 39.5     RESTRICTIONS/PRECAUTIONS: NWB    Exercises/Interventions:     Therapeutic Ex (76867)  resistance

## 2023-09-08 ENCOUNTER — HOSPITAL ENCOUNTER (OUTPATIENT)
Dept: PHYSICAL THERAPY | Age: 58
Setting detail: THERAPIES SERIES
Discharge: HOME | End: 2023-09-08
Payer: COMMERCIAL

## 2023-09-08 PROCEDURE — 97110 THERAPEUTIC EXERCISES: CPT | Performed by: PHYSICAL THERAPIST

## 2023-09-08 PROCEDURE — 97016 VASOPNEUMATIC DEVICE THERAPY: CPT | Performed by: PHYSICAL THERAPIST

## 2023-09-08 NOTE — FLOWSHEET NOTE
NEUROMUSCULAR RE-EDUCATION- Therapeutic procedure, 1 or more areas, each 15 minutes; neuromuscular reeducation of movement, balance, coordination, kinesthetic sense, posture, and/or proprioception for sitting and/or standing activities  (37714) MANUAL THERAPY-  Manual therapy techniques, 1 or more regions, each 15 minutes (Mobilization/manipulation, manual lymphatic drainage, manual traction) for the purpose of modulating pain, promoting relaxation,  increasing ROM, reducing/eliminating soft tissue swelling/inflammation/restriction, improving soft tissue extensibility and allowing for proper ROM for normal function with self care, mobility, lifting and ambulation  (16243) VASOPNEUMATIC    TREATMENT PLAN   Plan: Progress hip/core exercises as tolerated. Cont NWB status L LE. Electronically Signed by Jony Gerber PT  Date: 09/08/2023     Note: If patient does not return for scheduled/recommended follow up visits, this note will serve as a discharge from care along with the most recent update on progress.

## 2023-09-13 ENCOUNTER — HOSPITAL ENCOUNTER (OUTPATIENT)
Dept: PHYSICAL THERAPY | Age: 58
Setting detail: THERAPIES SERIES
Discharge: HOME | End: 2023-09-13
Payer: COMMERCIAL

## 2023-09-13 ENCOUNTER — OFFICE VISIT (OUTPATIENT)
Dept: ORTHOPEDIC SURGERY | Age: 58
End: 2023-09-13

## 2023-09-13 VITALS — WEIGHT: 163 LBS | BODY MASS INDEX: 26.2 KG/M2 | HEIGHT: 66 IN

## 2023-09-13 DIAGNOSIS — Z47.89 ORTHOPEDIC AFTERCARE: Primary | ICD-10-CM

## 2023-09-13 PROCEDURE — 99024 POSTOP FOLLOW-UP VISIT: CPT | Performed by: ORTHOPAEDIC SURGERY

## 2023-09-13 PROCEDURE — 97110 THERAPEUTIC EXERCISES: CPT | Performed by: PHYSICAL THERAPIST

## 2023-09-13 PROCEDURE — 97016 VASOPNEUMATIC DEVICE THERAPY: CPT | Performed by: PHYSICAL THERAPIST

## 2023-09-13 NOTE — PROGRESS NOTES
ORTHOPAEDIC SURGERY FOLLOWUP VISIT     CHIEF COMPLAINT: Left knee:     DATE OF INJURY: 8/1/2023  DIAGNOSIS: Left lateral tibial plateau fracture  DATE OF SURGERY: 8/3/2023, ORIF left lateral tibial plateau fracture     HISTORY OF PRESENT ILLNESS:  68-year-old female presents 6 weeks status post ORIF left lateral tibial plateau fracture. This was a die punch type pattern. This was treated by elevating the articular surface and calcium phosphate cement support. Overall, she is doing extremely well. She rates her pain 0 out of 10 today. No issues with wound healing. Denies fever, chills, night sweats. Has not had any wound drainage. She is bending her knee well. She has been compliant with nonweightbearing restrictions and use of her hinged knee brace. She has weaned completely from narcotic pain medications. PHYSICAL EXAM:  General: Well-appearing. No distress. Left lower extremity: Incisional sites are maturely healed. No signs of dehiscence. No open areas. No surrounding erythema. Knee range of motion gently assessed is full extension to 110 degrees of flexion related to the anterior knee swelling and stiffness. Anterior knee swelling is improved from previous evaluation. No significant effusion or hemarthrosis. Ligamentous stress exam was not carried out today. Sensation is intact to light touch in deep peroneal, superficial peroneal, tibial, sural, and saphenous nerve distributions. Motor function is intact to EHL, FHL, tibialis anterior, and gastroc. There is brisk capillary refill to the toes and a strong palpable dorsalis pedis pulse. Compartments are soft and compressible. There is no calf tenderness and a negative Homans' sign. RADIOGRAPHIC EXAM:  3 views of the left knee including AP, tunnel, lateral x-rays demonstrate internal fixation hardware in excellent position. Congruence of the lateral joint space is near-anatomic.   There is evidence of calcium phosphate bone void

## 2023-09-13 NOTE — FLOWSHEET NOTE
[address] [facility]  [address] office: [phone] fax: [phone]      Physical Therapy: TREATMENT/PROGRESS NOTE   Patient: Tyler Campos (83 y.o. female)   Treatment Date: 2023   :  1965 MRN: [id number]   Visit #: 5    Insurance: Payor: Ya [place]eks [place] / Plan: [place] / Product Type: *No Product type* /   Insurance ID: [id number] - (Medicaid Managed)  Secondary Insurance (if applicable):    Treatment Diagnosis:        Knee stiffness, left  M25.662          2. Left leg weakness  R29.898         3. Difficulty walking  R26.2         4. Generalized edema  R60.1        Medical Diagnosis:       Orthopedic aftercare [Z47.89]   Referring Physician: Victoriano Claudio MD  PCP: Hank Figueroa                             Plan of care signed (Y/N):     Date of Patient follow up with Physician: 23     Progress Report: EVAL today    Progress report due (10 Rx/or 30 days whichever is less): 34     Recertification due (POC duration/ or 90 days whichever is less): 23     Visit # Insurance Allowable Auth Needed   5 30 [x]Yes  after 30   []No     Latex Allergy:  [x]NO      []YES  Preferred Language for Healthcare:   [x]English       []other:    SUBJECTIVE EXAMINATION     Patient Report/Comments: Reports feeling stiffness, better than last time. Using walker at home and compliant with NWB status. No new issues to report. Sees MD today. Reports noticing L foot soreness this morning for some reason.      OBJECTIVE EXAMINATION     Observation:     Test measurements: see eval     Test used Initial score 2023   Pain Summary VAS 2/10 10   Functional questionnaire LEFS 88%    ROM       Resting ext -10 0    Knee flex 85 120         Strength Quad set fair Fair +            Before vaso/after Vaso   Girth  Midpat (norm 37.5) 40 cm  BV 38.8/ AV38.5 cm    5 cm sup (norm 38) 40.3 NT

## 2023-09-15 ENCOUNTER — HOSPITAL ENCOUNTER (OUTPATIENT)
Dept: PHYSICAL THERAPY | Age: 58
Setting detail: THERAPIES SERIES
Discharge: HOME | End: 2023-09-15
Payer: COMMERCIAL

## 2023-09-15 PROCEDURE — 97016 VASOPNEUMATIC DEVICE THERAPY: CPT | Performed by: PHYSICAL THERAPIST

## 2023-09-15 PROCEDURE — 97110 THERAPEUTIC EXERCISES: CPT | Performed by: PHYSICAL THERAPIST

## 2023-09-15 PROCEDURE — 97112 NEUROMUSCULAR REEDUCATION: CPT | Performed by: PHYSICAL THERAPIST

## 2023-09-15 NOTE — FLOWSHEET NOTE
08/23/2023  Prepared by: Fady Marcelo    Exercises  - Long Sitting Calf Stretch with Strap  - 2-3 x daily - 7 x weekly - 3 sets - 5 reps - 30\" hold  - Sitting Heel Slide with Towel  - 2-3 x daily - 7 x weekly - 1 sets - 10 reps - 10\" hold  - Seated Passive Knee Extension  - 2-3 x daily - 7 x weekly - 5 reps - 30 hold  - Long Sitting Quad Set  - 5-7 x daily - 7 x weekly - 1 sets - 10 reps - 10 hold  - Seated Long Arc Quad  - 2-3 x daily - 7 x weekly - 1-3 sets - 10 reps        ASSESSMENT     Today's Assessment:  Patient tolerated 50% WB well with 2 crutches. No issues with new exercises in WB this visit. Assess response. Pt requires skilled intervention to restore ROM, strength, functional endurance and balance in order to perform ADLs without significant symptoms or limitations. Medical Necessity Documentation:  I certify that this patient meets the below criteria necessary for medical necessity for care and/or justification of therapy services: The patient has a musculoskeletal condition(s) with a corresponding ICD-10 code that is of complexity and severity that require skilled therapeutic intervention. This has a direct and significant impact on the need for therapy and significantly impacts the rate of recovery. Treatment/Activity Tolerance:  [x] Patient tolerated treatment well [] Patient limited by fatique  [] Patient limited by pain  [] Patient limited by other medical complications  [] Other:     Return to Play: NA    Prognosis for POC: [x] Good [] Fair  [] Poor    Patient requires continued skilled intervention: [x] Yes  [] No      GOALS     Patient stated goal: return to work  Status:  [] Progressing: [] Met: [] Not Met: [] Adjusted    Therapist goals for Patient:   Short Term Goals: To be achieved in: 2 weeks  Independent in HEP and progression per patient tolerance, in order to progress toward full function and prevent re-injury.     Status:  [] Progressing: [] Met: [] Not Met: [] Adjusted  Patient

## 2023-09-20 ENCOUNTER — HOSPITAL ENCOUNTER (OUTPATIENT)
Dept: PHYSICAL THERAPY | Age: 58
Setting detail: THERAPIES SERIES
Discharge: HOME | End: 2023-09-20
Payer: COMMERCIAL

## 2023-09-20 PROCEDURE — 97110 THERAPEUTIC EXERCISES: CPT | Performed by: PHYSICAL THERAPIST

## 2023-09-20 PROCEDURE — 97016 VASOPNEUMATIC DEVICE THERAPY: CPT | Performed by: PHYSICAL THERAPIST

## 2023-09-20 PROCEDURE — 97112 NEUROMUSCULAR REEDUCATION: CPT | Performed by: PHYSICAL THERAPIST

## 2023-09-20 NOTE — FLOWSHEET NOTE
work  Status:  [x] Progressing: [] Met: [] Not Met: [] Adjusted    Therapist goals for Patient:   Short Term Goals: To be achieved in: 2 weeks  Independent in HEP and progression per patient tolerance, in order to progress toward full function and prevent re-injury. Status:  [] Progressing: [x] Met: [] Not Met: [] Adjusted  Patient will have a decrease in pain to 1/10 to help  facilitate improvement in movement, function, and ADLs as indicated by functional deficits. Status:  [] Progressing: [x] Met: [] Not Met: [] Adjusted    Long Term Goals: To be achieved in: 8-10 weeks  Disability index score of 50% or less for the LEFS to assist with return top prior level of function. Status:  [x] Progressing: [] Met: [] Not Met: [] Adjusted  Improve knee flexion PROM to 120-130 degrees or better to allow for proper joint functioning as indicated by patients functional deficits. Status:  [] Progressing: [x] Met: [] Not Met: [] Adjusted  Pt to improve strength to 4+/5 or better of proximal hip, quadriceps, and hamstringsto allow for proper muscle and joint use in functional mobility, ADLs and prior level of function  Status:  [x] Progressing: [] Met: [] Not Met: [] Adjusted  Patient will return to up/down 1 flight of stairs without increased symptoms or restriction to work towards return to prior level of function. Status:  [x] Progressing: [] Met: [] Not Met: [] Adjusted  Patient will be able to manage symptoms while resuming full duty at work. Status:  [x] Progressing: [] Met: [] Not Met: [] Adjusted        Overall Progression Towards Functional goals/ Treatment Progress Update:  [] Patient is progressing as expected towards functional goals listed. [] Progression is slowed due to complexities/Impairments listed. [] Progression has been slowed due to co-morbidities.   [x] Plan just implemented, too soon (<30days) to assess goals progression   [] Goals require adjustment due to lack of

## 2023-09-27 ENCOUNTER — HOSPITAL ENCOUNTER (OUTPATIENT)
Dept: PHYSICAL THERAPY | Age: 58
Setting detail: THERAPIES SERIES
Discharge: HOME | End: 2023-09-27
Payer: COMMERCIAL

## 2023-09-27 PROCEDURE — 97112 NEUROMUSCULAR REEDUCATION: CPT | Performed by: PHYSICAL THERAPIST

## 2023-09-27 PROCEDURE — 97110 THERAPEUTIC EXERCISES: CPT | Performed by: PHYSICAL THERAPIST

## 2023-09-27 NOTE — FLOWSHEET NOTE
tissue extensibility and allowing for proper ROM for normal function with self care, mobility, lifting and ambulation  (04700) VASOPNEUMATIC    TREATMENT PLAN   Plan: Progress hip/core exercises as tolerated. 50% WB status and progress gait training as tolerated. Electronically Signed by Jose Brush PT  Date: 09/27/2023     Note: If patient does not return for scheduled/recommended follow up visits, this note will serve as a discharge from care along with the most recent update on progress.

## 2023-10-02 ENCOUNTER — HOSPITAL ENCOUNTER (OUTPATIENT)
Dept: PHYSICAL THERAPY | Age: 58
Setting detail: THERAPIES SERIES
Discharge: HOME | End: 2023-10-02
Payer: COMMERCIAL

## 2023-10-02 PROCEDURE — 97016 VASOPNEUMATIC DEVICE THERAPY: CPT | Performed by: PHYSICAL THERAPIST

## 2023-10-02 PROCEDURE — 97110 THERAPEUTIC EXERCISES: CPT | Performed by: PHYSICAL THERAPIST

## 2023-10-02 PROCEDURE — 97112 NEUROMUSCULAR REEDUCATION: CPT | Performed by: PHYSICAL THERAPIST

## 2023-10-02 NOTE — FLOWSHEET NOTE
progression   [] Goals require adjustment due to lack of progress  [] Patient is not progressing as expected and requires additional follow up with physician  [] Other:     CHARGE CAPTURE     CHARGE GRID   CPT Code (TIMED) minutes # CPT Code (UNTIMED) #     [x] Therex (96000)  25 2  [] EVAL:LOW (77470)     [x] Neuromusc. Re-ed ([id number]) 15 1  [] Re-Eval (64889)     [] Manual (56983)    [] Estim Unattended (63648)     [] Ther. Act (62189)    [] Henok Augusto. Traction (65304)     [] Gait (50384)    [] Dry Needle 1-2 muscle (41113)     [] Aquatic Therex (16925)    [] Dry Needle 3+ muscle (96392)     [] Iontophoresis (12493)    [x] VASO (80753) 1    [] Ultrasound (68085)    [] Group Therapy (29882)     [] Other:    [] Other: Total Timed Code Tx Minutes 40'        Total Treatment Minutes 54'          Charge Justification:  (86582) THERAPEUTIC EXERCISE - Provided verbal/tactile cueing for activities related to strengthening, flexibility, endurance, ROM performed to prevent loss of range of motion, maintain or improve muscular strength or increase flexibility, following either an injury or surgery. (46349) [facility]- Reviewed/Progressed HEP activities related to strengthening, flexibility, endurance, ROM performed to prevent loss of range of motion, maintain or improve muscular strength or increase flexibility, following either an injury or surgery.   (86916) NEUROMUSCULAR RE-EDUCATION- Therapeutic procedure, 1 or more areas, each 15 minutes; neuromuscular reeducation of movement, balance, coordination, kinesthetic sense, posture, and/or proprioception for sitting and/or standing activities  (43721) MANUAL THERAPY-  Manual therapy techniques, 1 or more regions, each 15 minutes (Mobilization/manipulation, manual lymphatic drainage, manual traction) for the purpose of modulating pain, promoting relaxation,  increasing ROM, reducing/eliminating soft tissue swelling/inflammation/restriction, improving soft tissue

## 2023-10-04 ENCOUNTER — HOSPITAL ENCOUNTER (OUTPATIENT)
Dept: PHYSICAL THERAPY | Age: 58
Setting detail: THERAPIES SERIES
Discharge: HOME | End: 2023-10-04
Payer: COMMERCIAL

## 2023-10-04 PROCEDURE — 97110 THERAPEUTIC EXERCISES: CPT | Performed by: PHYSICAL THERAPIST

## 2023-10-04 PROCEDURE — 97112 NEUROMUSCULAR REEDUCATION: CPT | Performed by: PHYSICAL THERAPIST

## 2023-10-04 NOTE — FLOWSHEET NOTE
[address] [facility]  [address] office: [phone] fax: [phone]      Physical Therapy: TREATMENT/PROGRESS NOTE   Patient: Urbano Lloyd (6 y.o. female)   Treatment Date: 10/04/2023   :  1965 MRN: [id number]   Visit #: 10    Insurance: Payor: Wilber Brown [place] / Plan: [employer] [place] / Product Type: *No Product type* /   Insurance ID: [id number] - (Medicaid Managed)  Secondary Insurance (if applicable):    Treatment Diagnosis:        Knee stiffness, left  M25.662          2. Left leg weakness  R29.898         3. Difficulty walking  R26.2         4. Generalized edema  R60.1        Medical Diagnosis:       Orthopedic aftercare [Z47.89]   Referring Physician: Maximilian Barnhart MD  PCP: Alexa Barnett                             Plan of care signed (Y/N):     Date of Patient follow up with Physician: 10/25/23     Progress Report: 23    Progress report due (10 Rx/or 30 days whichever is less): 63//70    Recertification due (POC duration/ or 90 days whichever is less): 23     Visit # Insurance Allowable Auth Needed   10 30 [x]Yes  after 30   []No     Latex Allergy:  [x]NO      []YES  Preferred Language for Healthcare:   [x]English       []other:    SUBJECTIVE EXAMINATION     Patient Report/Comments: Reports seeing MD. Sabi Conklin looked good. Progress to 50% WB status. Tentative RTW date at the end of October. No issues to report this visit. Using crutches and tolerating 50% WB. Reports noticing increased hip OA pain lately B hips. OBJECTIVE EXAMINATION     Observation:     Test measurements: see evalAdvance to partial weightbearing 50% on left lower extremity. Pain control, OTC medications  DVT prophylaxis: May discontinue. May discontinue use of the hinged brace. I counseled the patient that she may want to use the hinged knee brace while unlocked while she progresses her weightbearing.   Wound

## 2023-10-09 ENCOUNTER — HOSPITAL ENCOUNTER (OUTPATIENT)
Dept: PHYSICAL THERAPY | Age: 58
Setting detail: THERAPIES SERIES
Discharge: HOME | End: 2023-10-09
Payer: COMMERCIAL

## 2023-10-09 PROCEDURE — 97110 THERAPEUTIC EXERCISES: CPT | Performed by: PHYSICAL THERAPIST

## 2023-10-09 PROCEDURE — 97112 NEUROMUSCULAR REEDUCATION: CPT | Performed by: PHYSICAL THERAPIST

## 2023-10-09 NOTE — FLOWSHEET NOTE
Play: NA    Prognosis for POC: [x] Good [] Fair  [] Poor    Patient requires continued skilled intervention: [x] Yes  [] No      GOALS     Patient stated goal: return to work  Status:  [x] Progressing: [] Met: [] Not Met: [] Adjusted    Therapist goals for Patient:   Short Term Goals: To be achieved in: 2 weeks  Independent in HEP and progression per patient tolerance, in order to progress toward full function and prevent re-injury. Status:  [] Progressing: [x] Met: [] Not Met: [] Adjusted  Patient will have a decrease in pain to 1/10 to help  facilitate improvement in movement, function, and ADLs as indicated by functional deficits. Status:  [] Progressing: [x] Met: [] Not Met: [] Adjusted    Long Term Goals: To be achieved in: 8-10 weeks  Disability index score of 50% or less for the LEFS to assist with return top prior level of function. Status:  [x] Progressing: [] Met: [] Not Met: [] Adjusted  Improve knee flexion PROM to 120-130 degrees or better to allow for proper joint functioning as indicated by patients functional deficits. Status:  [] Progressing: [x] Met: [] Not Met: [] Adjusted  Pt to improve strength to 4+/5 or better of proximal hip, quadriceps, and hamstringsto allow for proper muscle and joint use in functional mobility, ADLs and prior level of function  Status:  [x] Progressing: [] Met: [] Not Met: [] Adjusted  Patient will return to up/down 1 flight of stairs without increased symptoms or restriction to work towards return to prior level of function. Status:  [x] Progressing: [] Met: [] Not Met: [] Adjusted  Patient will be able to manage symptoms while resuming full duty at work. Status:  [x] Progressing: [] Met: [] Not Met: [] Adjusted        Overall Progression Towards Functional goals/ Treatment Progress Update:  [x] Patient is progressing as expected towards functional goals listed. [] Progression is slowed due to complexities/Impairments listed.   []

## 2023-10-11 ENCOUNTER — TELEPHONE (OUTPATIENT)
Dept: ORTHOPEDIC SURGERY | Age: 58
End: 2023-10-11

## 2023-10-11 ENCOUNTER — HOSPITAL ENCOUNTER (OUTPATIENT)
Dept: PHYSICAL THERAPY | Age: 58
Setting detail: THERAPIES SERIES
Discharge: HOME | End: 2023-10-11
Payer: COMMERCIAL

## 2023-10-11 PROCEDURE — 97110 THERAPEUTIC EXERCISES: CPT | Performed by: PHYSICAL THERAPIST

## 2023-10-11 PROCEDURE — 97112 NEUROMUSCULAR REEDUCATION: CPT | Performed by: PHYSICAL THERAPIST

## 2023-10-11 NOTE — FLOWSHEET NOTE
taps 2x10 reps Easily fatigued. Therapeutic Activity (04473)                                     Modalities:  L knee   No modalities applied this session    Education/Home Exercise Program: HEP instruction: Patient HEP program created electronically. Refer to [address] [place] access code: Access Code: [email or web address]  URL: [email or web address]  Date: 08/23/2023  Prepared by: Christel Garcia    Exercises  - Long Sitting Calf Stretch with Strap  - 2-3 x daily - 7 x weekly - 3 sets - 5 reps - 30\" hold  - Sitting Heel Slide with Towel  - 2-3 x daily - 7 x weekly - 1 sets - 10 reps - 10\" hold  - Seated Passive Knee Extension  - 2-3 x daily - 7 x weekly - 5 reps - 30 hold  - Long Sitting Quad Set  - 5-7 x daily - 7 x weekly - 1 sets - 10 reps - 10 hold  - Seated Long Arc Quad  - 2-3 x daily - 7 x weekly - 1-3 sets - 10 reps        ASSESSMENT     Today's Assessment:  Patient tolerated  FWB well with 2 crutches. No issues with new exercises in WB . Patient making progress with ROM/strength and functional mobility since initial visit. Making progress toward goals. Pt requires skilled intervention to restore ROM, strength, functional endurance and balance in order to perform ADLs without significant symptoms or limitations. Medical Necessity Documentation:  I certify that this patient meets the below criteria necessary for medical necessity for care and/or justification of therapy services: The patient has a musculoskeletal condition(s) with a corresponding ICD-10 code that is of complexity and severity that require skilled therapeutic intervention. This has a direct and significant impact on the need for therapy and significantly impacts the rate of recovery.      Treatment/Activity Tolerance:  [x] Patient tolerated treatment well [] Patient limited by fatique  [] Patient limited by pain  [] Patient limited by other medical complications  [] Other:     Return to Play: NA    Prognosis for POC: [x] Good [] Fair  []

## 2023-10-11 NOTE — TELEPHONE ENCOUNTER
Other PATIENT REQUESTING A LETTER FOR [facility]. THE LETTER WOULD NEED TO INCLUDE HER SX DATE AND WHY SHE HAS NOT BEEN ABLE TO WORK.  PLS SEND -[phone]

## 2023-10-16 ENCOUNTER — TELEPHONE (OUTPATIENT)
Dept: ORTHOPEDIC SURGERY | Age: 58
End: 2023-10-16

## 2023-10-16 ENCOUNTER — HOSPITAL ENCOUNTER (OUTPATIENT)
Dept: PHYSICAL THERAPY | Age: 58
Setting detail: THERAPIES SERIES
Discharge: HOME | End: 2023-10-16
Payer: COMMERCIAL

## 2023-10-16 PROCEDURE — 97112 NEUROMUSCULAR REEDUCATION: CPT | Performed by: PHYSICAL THERAPIST

## 2023-10-16 PROCEDURE — 97110 THERAPEUTIC EXERCISES: CPT | Performed by: PHYSICAL THERAPIST

## 2023-10-16 NOTE — TELEPHONE ENCOUNTER
Patient came in to office requesting [employer] letter stating she had surgery and that is why she has been off work. Please fax to [employer] and inform patient if letter has been done.

## 2023-10-16 NOTE — TELEPHONE ENCOUNTER
Ok to write letter. Date of injury 7/31/2023. Date of surgery 8/3/2023.  Anticipate ~4 months before able to perform duties with prolonged standing, etc.

## 2023-10-16 NOTE — FLOWSHEET NOTE
[address] [facility]  [address] office: [phone] fax: [phone]      Physical Therapy: TREATMENT/PROGRESS NOTE   Patient: Tabby Mina (23 y.o. female)   Treatment Date: 10/16/2023   :  1965 MRN: [id number]   Visit #: 13    Insurance: Payor: Yuridia Lal [place] / Plan: Sarah Hopper / Product Type: *No Product type* /   Insurance ID: [id number] - (Medicaid Managed)  Secondary Insurance (if applicable):    Treatment Diagnosis:        Knee stiffness, left  M25.662          2. Left leg weakness  R29.898         3. Difficulty walking  R26.2         4. Generalized edema  R60.1        Medical Diagnosis:       Orthopedic aftercare [Z47.89]   Referring Physician: Theodore Mccoy MD  PCP: JUANCARLOS Reynoso NP                             Plan of care signed (Y/N):     Date of Patient follow up with Physician: 10/25/23     Progress Report: 23    Progress report due (10 Rx/or 30 days whichever is less): 63/55/05    Recertification due (POC duration/ or 90 days whichever is less): 23     Visit # Insurance Allowable Auth Needed   13 30 [x]Yes  after 30   []No     Latex Allergy:  [x]NO      []YES  Preferred Language for Healthcare:   [x]English       []other:    SUBJECTIVE EXAMINATION     Patient Report/Comments:  Tentative RTW date at the end of October. Reports no issues in general from last visit. Per MD orders can progress to FWB as tolerated starting 10/9/23. B hip soreness still noted . No issues from last visit. Hips stay sore for a few days after PT in general.    OBJECTIVE EXAMINATION     Observation:     Test measurements: see evalAdvance to FWB weightbearing on left lower extremity. Pain control, OTC medications  DVT prophylaxis: May discontinue. May discontinue use of the hinged brace.   I counseled the patient that she may want to use the hinged knee brace while unlocked while she

## 2023-10-18 ENCOUNTER — HOSPITAL ENCOUNTER (OUTPATIENT)
Dept: PHYSICAL THERAPY | Age: 58
Setting detail: THERAPIES SERIES
Discharge: HOME | End: 2023-10-18
Payer: COMMERCIAL

## 2023-10-18 PROCEDURE — 97110 THERAPEUTIC EXERCISES: CPT | Performed by: PHYSICAL THERAPIST

## 2023-10-18 PROCEDURE — 97112 NEUROMUSCULAR REEDUCATION: CPT | Performed by: PHYSICAL THERAPIST

## 2023-10-18 NOTE — FLOWSHEET NOTE
[address] [facility]  [address] office: [phone] fax: [phone]      Physical Therapy: TREATMENT/PROGRESS NOTE   Patient: Etienne Ruzi (22 y.o. female)   Treatment Date: 10/18/2023   :  1965 MRN: [id number]   Visit #: 14    Insurance: Payor: Vonnie Reaves [place] / Plan: Rachael Angle / Product Type: *No Product type* /   Insurance ID: [id number] - (Medicaid Managed)  Secondary Insurance (if applicable):    Treatment Diagnosis:        Knee stiffness, left  M25.662          2. Left leg weakness  R29.898         3. Difficulty walking  R26.2         4. Generalized edema  R60.1        Medical Diagnosis:       Orthopedic aftercare [Z47.89]   Referring Physician: Gabi Evangelista MD  PCP: JUANCARLOS Hines NP                             Plan of care signed (Y/N):     Date of Patient follow up with Physician: 10/25/23     Progress Report: 23. N.V. Progress report due (10 Rx/or 30 days whichever is less):     Recertification due (POC duration/ or 90 days whichever is less): 23     Visit # Insurance Allowable Auth Needed   14 30 [x]Yes  after 30   []No     Latex Allergy:  [x]NO      []YES  Preferred Language for Healthcare:   [x]English       []other:    SUBJECTIVE EXAMINATION     Patient Report/Comments:  Tentative RTW date at the end of October. Reports no issues in general from last visit. Per MD orders can progress to FWB as tolerated starting 10/9/23. B hip soreness still noted . Increased muscle soreness after last visit. OBJECTIVE EXAMINATION     Observation:     Test measurements: see evalAdvance to FWB weightbearing on left lower extremity. Pain control, OTC medications  DVT prophylaxis: May discontinue. May discontinue use of the hinged brace.   I counseled the patient that she may want to use the hinged knee brace while unlocked while she progresses her

## 2023-10-23 ENCOUNTER — HOSPITAL ENCOUNTER (OUTPATIENT)
Dept: PHYSICAL THERAPY | Age: 58
Setting detail: THERAPIES SERIES
Discharge: HOME | End: 2023-10-23
Payer: COMMERCIAL

## 2023-10-23 PROCEDURE — 97110 THERAPEUTIC EXERCISES: CPT | Performed by: PHYSICAL THERAPIST

## 2023-10-23 PROCEDURE — 97112 NEUROMUSCULAR REEDUCATION: CPT | Performed by: PHYSICAL THERAPIST

## 2023-10-23 PROCEDURE — 97140 MANUAL THERAPY 1/> REGIONS: CPT | Performed by: PHYSICAL THERAPIST

## 2023-10-23 NOTE — FLOWSHEET NOTE
N/A  Return to clinic in 4 weeks for repeat x-rays. Anticipate advancing to 50% partial weightbearing at that time. Okay to advance to full weightbearing as tolerated the week of October 9, 2023. She will will follow-up at 3 months postop for repeat set of x-rays. Anticipate attempt to return to work at the end of October. Work note provided. Test used 8/23/23 09/20/2023 10/23/23   Pain Summary VAS 2/10 1/10 0-1/10   Functional questionnaire LEFS 88% 41/80 49% 72/80  10%   ROM        Resting ext -10 0 0    Knee flex LLE 85 123 140          Strength Quad set fair Good - Good    Hip flex L/R NT 4/4+ 4+    Hip abd L/R NT 3+/4- 4-/4-   Girth  Midpat (norm 37.5) 40 cm  BV 38.5 AV38.3cm 38.0 cm    5 cm sup (norm 38) 40.3 NT 38.0 cm     RESTRICTIONS/PRECAUTIONS: FWB     Exercises/Interventions:     Therapeutic Ex (97161)  resistance Notes/Cues/Progressions   HEP as below 15 min    Standing HR/TR/ eccentrics on L    Standing mini squats at bar    Incline stretches H30 5 reps    Bike for ROM      Elliptical 5'         CC side stepping     CC retro walking     MH TKE B LE only MH Hip abd B LE    LP  H5 90 # 2x10  reps    LP eccentrics H5 70# 2x10 reps    Hamstring stretches on stairs/ hip flexor stretches H30 x5 reps    Sitting HS with strap    Supine heel slides with strap H5 x10 reps 132 this visit.     Quad set    LAQ 2#    SAQ 2#    SLR 2# Side stepping with LVL     SL clams with OVL H5 2x10 reps  HEP   SL hip abd 2# H5 x15 reps    Prone hip ext  2# H5 2x10 reps    Prone gluts     Prone knee flexion     Prone knee flexion with strap H30 x5 reps    Manual Intervention (99479)          LAD B hips  5'     Prone hip mobs  P-A glides 5' If needed                  NMR re-education (56988) resistance CUES NEEDED   Standing balance feet together Airex    Tandem stance with Airex H15 5reps    Gait training without crutches 5' FWB   Stair training with crutches         FSU up and over 8\" 1x20 reps    LSD 4\" heel taps

## 2023-10-25 ENCOUNTER — APPOINTMENT (OUTPATIENT)
Dept: PHYSICAL THERAPY | Age: 58
End: 2023-10-25
Payer: COMMERCIAL

## 2023-10-25 ENCOUNTER — OFFICE VISIT (OUTPATIENT)
Dept: ORTHOPEDIC SURGERY | Age: 58
End: 2023-10-25

## 2023-10-25 VITALS — WEIGHT: 163 LBS | BODY MASS INDEX: 26.2 KG/M2 | HEIGHT: 66 IN

## 2023-10-25 DIAGNOSIS — Z47.89 ORTHOPEDIC AFTERCARE: Primary | ICD-10-CM

## 2023-10-25 PROCEDURE — 99024 POSTOP FOLLOW-UP VISIT: CPT | Performed by: ORTHOPAEDIC SURGERY

## 2023-10-25 RX ORDER — LEVOTHYROXINE SODIUM 0.1 MG/1
TABLET ORAL
COMMUNITY
Start: 2023-09-08

## 2023-10-25 NOTE — PROGRESS NOTES
ORTHOPAEDIC SURGERY FOLLOWUP VISIT     CHIEF COMPLAINT: Left knee:     DATE OF INJURY: 8/1/2023  DIAGNOSIS: Left lateral tibial plateau fracture  DATE OF SURGERY: 8/3/2023, ORIF left lateral tibial plateau fracture     HISTORY OF PRESENT ILLNESS:  77-year-old female presents 12 weeks status post ORIF left lateral tibial plateau fracture. This was a die punch type pattern. This was treated by elevating the articular surface and calcium phosphate cement support. Overall, she is doing extremely well. She rates her pain 0 out of 10 today. She is ambulating without assist devices. She denies any pain with activities. She reports that she is bending her knee extremely well to approximately 140 degrees of flexion. She did not have any issues with wound healing. She anticipates returning to work. PHYSICAL EXAM:  General: Well-appearing. No distress. Left lower extremity: Incisional sites are maturely healed. No signs of dehiscence. No open areas. No surrounding erythema. Knee range of motion gently assessed is full extension to 140 degrees of flexion. Anterior knee swelling has resolved from previous evaluation. No significant effusion or hemarthrosis. Ligamentous stress exam was not carried out today. Sensation is intact to light touch in deep peroneal, superficial peroneal, tibial, sural, and saphenous nerve distributions. Motor function is intact to EHL, FHL, tibialis anterior, and gastroc. There is brisk capillary refill to the toes and a strong palpable dorsalis pedis pulse. Compartments are soft and compressible. There is no calf tenderness and a negative Homans' sign. Gait: The patient was observed ambulating without the use of assist devices with a smooth confident nonantalgic gait. RADIOGRAPHIC EXAM:  3 views of the left knee including AP, tunnel, lateral x-rays demonstrate internal fixation hardware in excellent position. Congruence of the lateral joint space is anatomic.   There

## 2023-12-27 ENCOUNTER — HOSPITAL ENCOUNTER (OUTPATIENT)
Dept: GENERAL RADIOLOGY | Age: 58
Discharge: HOME | End: 2023-12-27
Payer: COMMERCIAL

## 2023-12-27 DIAGNOSIS — N95.8 OTHER SPECIFIED MENOPAUSAL AND PERIMENOPAUSAL DISORDERS: ICD-10-CM

## 2023-12-27 PROCEDURE — 77080 DXA BONE DENSITY AXIAL: CPT

## 2024-02-24 ENCOUNTER — HOSPITAL ENCOUNTER (EMERGENCY)
Age: 59
Discharge: HOME | End: 2024-02-24
Attending: EMERGENCY MEDICINE
Payer: COMMERCIAL

## 2024-02-24 ENCOUNTER — APPOINTMENT (OUTPATIENT)
Dept: CT IMAGING | Age: 59
End: 2024-02-24
Payer: COMMERCIAL

## 2024-02-24 VITALS
RESPIRATION RATE: 16 BRPM | SYSTOLIC BLOOD PRESSURE: 118 MMHG | WEIGHT: 170 LBS | OXYGEN SATURATION: 99 % | BODY MASS INDEX: 27.44 KG/M2 | TEMPERATURE: 98.3 F | HEART RATE: 77 BPM | DIASTOLIC BLOOD PRESSURE: 69 MMHG

## 2024-02-24 DIAGNOSIS — K11.20 PAROTITIS: Primary | ICD-10-CM

## 2024-02-24 LAB
ANION GAP SERPL CALCULATED.3IONS-SCNC: 13 MMOL/L (ref 3–16)
BASOPHILS # BLD: 0.1 K/UL (ref 0–0.2)
BASOPHILS NFR BLD: 1 %
BUN SERPL-MCNC: 14 MG/DL (ref 7–20)
CALCIUM SERPL-MCNC: 9.8 MG/DL (ref 8.3–10.6)
CHLORIDE SERPL-SCNC: 102 MMOL/L (ref 99–110)
CO2 SERPL-SCNC: 22 MMOL/L (ref 21–32)
CREAT SERPL-MCNC: <0.5 MG/DL (ref 0.6–1.2)
DEPRECATED RDW RBC AUTO: 13.1 % (ref 12.4–15.4)
EOSINOPHIL # BLD: 0.2 K/UL (ref 0–0.6)
EOSINOPHIL NFR BLD: 2 %
GFR SERPLBLD CREATININE-BSD FMLA CKD-EPI: >60 ML/MIN/{1.73_M2}
GLUCOSE SERPL-MCNC: 86 MG/DL (ref 70–99)
HCT VFR BLD AUTO: 43.9 % (ref 36–48)
HGB BLD-MCNC: 15.1 G/DL (ref 12–16)
LYMPHOCYTES # BLD: 2.2 K/UL (ref 1–5.1)
LYMPHOCYTES NFR BLD: 17 %
MCH RBC QN AUTO: 31.5 PG (ref 26–34)
MCHC RBC AUTO-ENTMCNC: 34.3 G/DL (ref 31–36)
MCV RBC AUTO: 91.9 FL (ref 80–100)
MONOCYTES # BLD: 0.8 K/UL (ref 0–1.3)
MONOCYTES NFR BLD: 7 %
NEUTROPHILS # BLD: 7.6 K/UL (ref 1.7–7.7)
NEUTROPHILS NFR BLD: 67 %
NEUTS BAND NFR BLD MANUAL: 3 % (ref 0–7)
PLATELET # BLD AUTO: 284 K/UL (ref 135–450)
PLATELET BLD QL SMEAR: ADEQUATE
PMV BLD AUTO: 8.1 FL (ref 5–10.5)
POTASSIUM SERPL-SCNC: 4.7 MMOL/L (ref 3.5–5.1)
RBC # BLD AUTO: 4.78 M/UL (ref 4–5.2)
RBC MORPH BLD: NORMAL
SLIDE REVIEW: NORMAL
SODIUM SERPL-SCNC: 137 MMOL/L (ref 136–145)
VARIANT LYMPHS NFR BLD MANUAL: 3 % (ref 0–6)
WBC # BLD AUTO: 10.9 K/UL (ref 4–11)

## 2024-02-24 PROCEDURE — 70491 CT SOFT TISSUE NECK W/DYE: CPT

## 2024-02-24 PROCEDURE — 99285 EMERGENCY DEPT VISIT HI MDM: CPT

## 2024-02-24 PROCEDURE — 6360000004 HC RX CONTRAST MEDICATION: Performed by: EMERGENCY MEDICINE

## 2024-02-24 PROCEDURE — 85025 COMPLETE CBC W/AUTO DIFF WBC: CPT

## 2024-02-24 PROCEDURE — 80048 BASIC METABOLIC PNL TOTAL CA: CPT

## 2024-02-24 RX ORDER — CLINDAMYCIN HYDROCHLORIDE 300 MG/1
300 CAPSULE ORAL 3 TIMES DAILY
Qty: 30 CAPSULE | Refills: 0 | Status: SHIPPED | OUTPATIENT
Start: 2024-02-24 | End: 2024-03-05

## 2024-02-24 RX ORDER — KETOROLAC TROMETHAMINE 30 MG/ML
15 INJECTION, SOLUTION INTRAMUSCULAR; INTRAVENOUS ONCE
Status: DISCONTINUED | OUTPATIENT
Start: 2024-02-24 | End: 2024-02-24 | Stop reason: HOSPADM

## 2024-02-24 RX ADMIN — IOPAMIDOL 75 ML: 755 INJECTION, SOLUTION INTRAVENOUS at 09:11

## 2024-02-24 ASSESSMENT — PAIN - FUNCTIONAL ASSESSMENT: PAIN_FUNCTIONAL_ASSESSMENT: 0-10

## 2024-02-24 ASSESSMENT — PAIN SCALES - GENERAL
PAINLEVEL_OUTOF10: 3
PAINLEVEL_OUTOF10: 0

## 2024-02-24 NOTE — DISCHARGE INSTRUCTIONS
You have evidence of parotitis.  Treat with warm compresses, Tylenol and ibuprofen.  Get lemon drops or sour candies to promote saliva production and secretion.  Take antibiotics as prescribed.  Follow-up with ear nose and throat doctor.  Return to emergency department if you develop any concerns for airway issue or other emergent concerns as we discussed

## 2024-02-24 NOTE — ED NOTES
ED ENT CONSULT  RE- parotitis, hx, parotid CA  1011-paged ent  1017- returned page, transferred to

## 2024-02-24 NOTE — ED NOTES
Pt state that last night she notice a small amount of left sided neck swelling this AM it has doubled in size. Pt state no dental problems. 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

## 2024-11-04 ENCOUNTER — APPOINTMENT (OUTPATIENT)
Dept: GENERAL RADIOLOGY | Age: 59
End: 2024-11-04
Payer: COMMERCIAL

## 2024-11-04 ENCOUNTER — HOSPITAL ENCOUNTER (EMERGENCY)
Age: 59
Discharge: HOME | End: 2024-11-04
Payer: COMMERCIAL

## 2024-11-04 VITALS
OXYGEN SATURATION: 98 % | DIASTOLIC BLOOD PRESSURE: 82 MMHG | HEART RATE: 80 BPM | TEMPERATURE: 97.6 F | RESPIRATION RATE: 16 BRPM | SYSTOLIC BLOOD PRESSURE: 142 MMHG

## 2024-11-04 DIAGNOSIS — S93.402A SPRAIN OF LEFT ANKLE, UNSPECIFIED LIGAMENT, INITIAL ENCOUNTER: Primary | ICD-10-CM

## 2024-11-04 PROCEDURE — 73610 X-RAY EXAM OF ANKLE: CPT

## 2024-11-04 PROCEDURE — 99283 EMERGENCY DEPT VISIT LOW MDM: CPT

## 2024-11-04 RX ORDER — ONDANSETRON 4 MG/1
4 TABLET, FILM COATED ORAL EVERY 8 HOURS PRN
Qty: 12 TABLET | Refills: 0 | Status: SHIPPED | OUTPATIENT
Start: 2024-11-04

## 2024-11-04 ASSESSMENT — PAIN DESCRIPTION - DESCRIPTORS: DESCRIPTORS: SORE;ACHING

## 2024-11-04 ASSESSMENT — PAIN DESCRIPTION - LOCATION: LOCATION: ANKLE

## 2024-11-04 ASSESSMENT — PAIN - FUNCTIONAL ASSESSMENT
PAIN_FUNCTIONAL_ASSESSMENT: 0-10
PAIN_FUNCTIONAL_ASSESSMENT: 0-10

## 2024-11-04 ASSESSMENT — PAIN SCALES - GENERAL
PAINLEVEL_OUTOF10: 6
PAINLEVEL_OUTOF10: 4

## 2024-11-04 ASSESSMENT — PAIN DESCRIPTION - ORIENTATION: ORIENTATION: LEFT

## 2024-11-04 NOTE — ED PROVIDER NOTES
[facility] ED  EMERGENCY DEPARTMENT ENCOUNTER        Pt Name: Perla Mccarty  MRN: [id number]  Birthdate 1965  Date of evaluation: 11/4/2024  Provider: Juliana Stephenson PA-C  PCP: Evelin Fraga APRN - NP  Note Started: 6:27 PM EST 11/4/24      AHSAN. I have evaluated this patient.        CHIEF COMPLAINT       Chief Complaint   Patient presents with    Ankle Pain     Twisted ankle coming out of daughters house       HISTORY OF PRESENT ILLNESS: 1 or more Elements     History From: Patient            Chief Complaint: Ankle pain    Perla Mccarty is a 59 y.o. female who presents coming in because she twisted her ankle today walking at her daughter's house, she was walking outside on uneven ground.  She denies any other injuries.  She is having pain when she walks to the lateral aspect of her foot and ankle with some swelling.  She has had a history of a fracture to her left upper leg in the past.  No numbness.    Nursing Notes were all reviewed and agreed with or any disagreements were addressed in the HPI.    REVIEW OF SYSTEMS :      Review of Systems    Positives and Pertinent negatives as per HPI.     SURGICAL HISTORY     Past Surgical History:   Procedure Laterality Date    BACK SURGERY N/A 12/20/2021    BACK CYST EXCISION performed by Mello Ramirez MD at [facility] OR    DILATION AND CURETTAGE OF UTERUS N/A 6/1/2021    VIDEO HYSTEROSCOPY DILATATION AND CURETTAGE performed by Haley Chiang DO at [facility] OR    MANDIBLE SURGERY Right 2005    cancerous tumor removed along with parotid gland    TIBIA FRACTURE SURGERY Left 8/3/2023    OPEN REDUCTION INTERNAL FIXATION OF LEFT TIBIAL PLATEAU FRACTURE performed by Michael Shabazz MD at [facility] OR       CURRENTMEDICATIONS       Previous Medications    IBUPROFEN (ADVIL;MOTRIN) 200 MG TABLET    Take 1 tablet by mouth every 6 hours as needed for Pain    LEVOTHYROXINE (SYNTHROID) 100 MCG TABLET        LIOTHYRONINE (CYTOMEL) 5 MCG TABLET

## 2024-12-18 NOTE — PROGRESS NOTES
[facility]  DIVISION OF OTOLARYNGOLOGY- HEAD & NECK SURGERY  CONSULT    Patient Name: Perla Mccarty  Medical Record Number:  [id number]  Primary Care Physician:  Evelin Fraga APRN - NP  Date of Consultation: 12/19/2024    Chief Complaint:   Chief Complaint   Patient presents with    New Patient    Cerumen Impaction     Radiation ear from past. - muffled hearing right now.       HISTORY OF PRESENT ILLNESS  Perla is a(n) 59 y.o. female who presents for evaluation of decreased hearing on the right side.  She has a wax impaction on the right side which has not been successfully removed causing the hearing loss.  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  She has been using steroid eardrops for the last 2 to 3 days to try to loosen the wax  Patient Active Problem List   Diagnosis    Abnormal uterine bleeding due to endometrial polyp    Status post hysteroscopic polypectomy    Sebaceous cyst     Past Surgical History:   Procedure Laterality Date    BACK SURGERY N/A 12/20/2021    BACK CYST EXCISION performed by Mello Ramirez MD at [facility] OR    DILATION AND CURETTAGE OF UTERUS N/A 6/1/2021    VIDEO HYSTEROSCOPY DILATATION AND CURETTAGE performed by Haley Chiang DO at [facility] OR    MANDIBLE SURGERY Right 2005    cancerous tumor removed along with parotid gland    TIBIA FRACTURE SURGERY Left 8/3/2023    OPEN REDUCTION INTERNAL FIXATION OF LEFT TIBIAL PLATEAU FRACTURE performed by Michael Shabazz MD at [facility] OR     Family History   Problem Relation Age of Onset    Heart Disease Mother     Cancer Father         pancreatic    Breast Cancer Sister         over 50    BRCA 1 Negative Sister      Social History     Socioeconomic History    Marital status:      Spouse name: Not on file    Number of children: Not on file    Years of education: Not on file    Highest education level: Not on file   Occupational History    Not on file

## 2024-12-19 ENCOUNTER — OFFICE VISIT (OUTPATIENT)
Dept: ENT CLINIC | Age: 59
End: 2024-12-19
Payer: COMMERCIAL

## 2024-12-19 VITALS
WEIGHT: 209 LBS | SYSTOLIC BLOOD PRESSURE: 140 MMHG | HEART RATE: 93 BPM | BODY MASS INDEX: 33.59 KG/M2 | DIASTOLIC BLOOD PRESSURE: 90 MMHG | HEIGHT: 66 IN

## 2024-12-19 DIAGNOSIS — H61.21 IMPACTED CERUMEN OF RIGHT EAR: ICD-10-CM

## 2024-12-19 DIAGNOSIS — H91.91 HEARING LOSS OF RIGHT EAR, UNSPECIFIED HEARING LOSS TYPE: Primary | ICD-10-CM

## 2024-12-19 PROCEDURE — G9899 SCRN MAM PERF RSLTS DOC: HCPCS | Performed by: STUDENT IN AN ORGANIZED HEALTH CARE EDUCATION/TRAINING PROGRAM

## 2024-12-19 PROCEDURE — 69210 REMOVE IMPACTED EAR WAX UNI: CPT | Performed by: STUDENT IN AN ORGANIZED HEALTH CARE EDUCATION/TRAINING PROGRAM

## 2024-12-19 PROCEDURE — G8427 DOCREV CUR MEDS BY ELIG CLIN: HCPCS | Performed by: STUDENT IN AN ORGANIZED HEALTH CARE EDUCATION/TRAINING PROGRAM

## 2024-12-19 PROCEDURE — G8484 FLU IMMUNIZE NO ADMIN: HCPCS | Performed by: STUDENT IN AN ORGANIZED HEALTH CARE EDUCATION/TRAINING PROGRAM

## 2024-12-19 PROCEDURE — 1036F TOBACCO NON-USER: CPT | Performed by: STUDENT IN AN ORGANIZED HEALTH CARE EDUCATION/TRAINING PROGRAM

## 2024-12-19 PROCEDURE — 99203 OFFICE O/P NEW LOW 30 MIN: CPT | Performed by: STUDENT IN AN ORGANIZED HEALTH CARE EDUCATION/TRAINING PROGRAM

## 2024-12-19 PROCEDURE — G8417 CALC BMI ABV UP PARAM F/U: HCPCS | Performed by: STUDENT IN AN ORGANIZED HEALTH CARE EDUCATION/TRAINING PROGRAM

## 2024-12-19 PROCEDURE — 3017F COLORECTAL CA SCREEN DOC REV: CPT | Performed by: STUDENT IN AN ORGANIZED HEALTH CARE EDUCATION/TRAINING PROGRAM

## 2024-12-19 RX ORDER — FLUOCINOLONE ACETONIDE 0.11 MG/ML
OIL AURICULAR (OTIC)
COMMUNITY
Start: 2024-12-05

## 2024-12-19 ASSESSMENT — ENCOUNTER SYMPTOMS
VOMITING: 0
COUGH: 0
EYE PAIN: 0
NAUSEA: 0
RHINORRHEA: 0
SHORTNESS OF BREATH: 0

## 2024-12-23 ASSESSMENT — ENCOUNTER SYMPTOMS
EYE PAIN: 0
RHINORRHEA: 0
SHORTNESS OF BREATH: 0
COUGH: 0
VOMITING: 0
NAUSEA: 0

## 2024-12-23 NOTE — PROGRESS NOTES
[facility]  DIVISION OF OTOLARYNGOLOGY- HEAD & NECK SURGERY  CONSULT    Patient Name: Perla Mccarty  Medical Record Number:  [id number]  Primary Care Physician:  Evelin Fraga APRN - NP  Date of Consultation: 12/26/2024    Chief Complaint:   Chief Complaint   Patient presents with    Follow-up    Cerumen Impaction      HISTORY OF PRESENT ILLNESS  Perla is a(n) 59 y.o. female who presents for evaluation of decreased hearing on the right side.  She has a wax impaction on the right side which has not been successfully removed causing the hearing loss.  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  She has been using steroid eardrops for the last 2 to 3 days to try to loosen the wax    Interval History 12/26/24  Perla continues to have decreased hearing on the right side.  She has been using Debrox for the last week.  She has not had significant improvement    Patient Active Problem List   Diagnosis    Abnormal uterine bleeding due to endometrial polyp    Status post hysteroscopic polypectomy    Sebaceous cyst     Past Surgical History:   Procedure Laterality Date    BACK SURGERY N/A 12/20/2021    BACK CYST EXCISION performed by Mello Ramirez MD at [facility] OR    DILATION AND CURETTAGE OF UTERUS N/A 6/1/2021    VIDEO HYSTEROSCOPY DILATATION AND CURETTAGE performed by Haley Chiang DO at [facility] OR    MANDIBLE SURGERY Right 2005    cancerous tumor removed along with parotid gland    TIBIA FRACTURE SURGERY Left 8/3/2023    OPEN REDUCTION INTERNAL FIXATION OF LEFT TIBIAL PLATEAU FRACTURE performed by Michael Shabazz MD at [facility] OR     Family History   Problem Relation Age of Onset    Heart Disease Mother     Cancer Father         pancreatic    Breast Cancer Sister         over 50    BRCA 1 Negative Sister      Social History     Socioeconomic History    Marital status:      Spouse name: Not on file    Number of children: Not on

## 2024-12-26 ENCOUNTER — OFFICE VISIT (OUTPATIENT)
Dept: ENT CLINIC | Age: 59
End: 2024-12-26
Payer: COMMERCIAL

## 2024-12-26 VITALS
BODY MASS INDEX: 33.11 KG/M2 | HEIGHT: 66 IN | WEIGHT: 206 LBS | SYSTOLIC BLOOD PRESSURE: 132 MMHG | HEART RATE: 82 BPM | DIASTOLIC BLOOD PRESSURE: 81 MMHG

## 2024-12-26 DIAGNOSIS — H91.91 HEARING LOSS OF RIGHT EAR, UNSPECIFIED HEARING LOSS TYPE: ICD-10-CM

## 2024-12-26 DIAGNOSIS — H71.91 CHOLESTEATOMA OF EAR, RIGHT: Primary | ICD-10-CM

## 2024-12-26 DIAGNOSIS — H61.21 IMPACTED CERUMEN OF RIGHT EAR: ICD-10-CM

## 2024-12-26 PROCEDURE — G8484 FLU IMMUNIZE NO ADMIN: HCPCS | Performed by: STUDENT IN AN ORGANIZED HEALTH CARE EDUCATION/TRAINING PROGRAM

## 2024-12-26 PROCEDURE — 99213 OFFICE O/P EST LOW 20 MIN: CPT | Performed by: STUDENT IN AN ORGANIZED HEALTH CARE EDUCATION/TRAINING PROGRAM

## 2024-12-26 PROCEDURE — G8417 CALC BMI ABV UP PARAM F/U: HCPCS | Performed by: STUDENT IN AN ORGANIZED HEALTH CARE EDUCATION/TRAINING PROGRAM

## 2024-12-26 PROCEDURE — 1036F TOBACCO NON-USER: CPT | Performed by: STUDENT IN AN ORGANIZED HEALTH CARE EDUCATION/TRAINING PROGRAM

## 2024-12-26 PROCEDURE — G8427 DOCREV CUR MEDS BY ELIG CLIN: HCPCS | Performed by: STUDENT IN AN ORGANIZED HEALTH CARE EDUCATION/TRAINING PROGRAM

## 2024-12-26 PROCEDURE — G9899 SCRN MAM PERF RSLTS DOC: HCPCS | Performed by: STUDENT IN AN ORGANIZED HEALTH CARE EDUCATION/TRAINING PROGRAM

## 2024-12-26 PROCEDURE — 3017F COLORECTAL CA SCREEN DOC REV: CPT | Performed by: STUDENT IN AN ORGANIZED HEALTH CARE EDUCATION/TRAINING PROGRAM

## 2024-12-26 PROCEDURE — 69210 REMOVE IMPACTED EAR WAX UNI: CPT | Performed by: STUDENT IN AN ORGANIZED HEALTH CARE EDUCATION/TRAINING PROGRAM

## 2025-01-02 ENCOUNTER — HOSPITAL ENCOUNTER (OUTPATIENT)
Dept: CT IMAGING | Age: 60
Discharge: HOME | End: 2025-01-02
Attending: STUDENT IN AN ORGANIZED HEALTH CARE EDUCATION/TRAINING PROGRAM
Payer: COMMERCIAL

## 2025-01-02 DIAGNOSIS — H71.91 CHOLESTEATOMA OF EAR, RIGHT: ICD-10-CM

## 2025-01-02 PROCEDURE — 70480 CT ORBIT/EAR/FOSSA W/O DYE: CPT

## 2025-05-10 ENCOUNTER — HOSPITAL ENCOUNTER (EMERGENCY)
Age: 60
Discharge: HOME | End: 2025-05-10
Payer: COMMERCIAL

## 2025-05-10 ENCOUNTER — APPOINTMENT (OUTPATIENT)
Dept: GENERAL RADIOLOGY | Age: 60
End: 2025-05-10
Payer: COMMERCIAL

## 2025-05-10 VITALS
OXYGEN SATURATION: 99 % | DIASTOLIC BLOOD PRESSURE: 98 MMHG | WEIGHT: 216.4 LBS | HEIGHT: 66 IN | HEART RATE: 88 BPM | BODY MASS INDEX: 34.78 KG/M2 | SYSTOLIC BLOOD PRESSURE: 160 MMHG | TEMPERATURE: 98.2 F | RESPIRATION RATE: 18 BRPM

## 2025-05-10 DIAGNOSIS — M25.552 LEFT HIP PAIN: Primary | ICD-10-CM

## 2025-05-10 PROCEDURE — 73502 X-RAY EXAM HIP UNI 2-3 VIEWS: CPT

## 2025-05-10 PROCEDURE — 99283 EMERGENCY DEPT VISIT LOW MDM: CPT

## 2025-05-10 ASSESSMENT — PAIN SCALES - GENERAL: PAINLEVEL_OUTOF10: 4

## 2025-05-10 ASSESSMENT — PAIN DESCRIPTION - LOCATION: LOCATION: HIP

## 2025-05-10 ASSESSMENT — PAIN - FUNCTIONAL ASSESSMENT: PAIN_FUNCTIONAL_ASSESSMENT: 0-10

## 2025-05-10 NOTE — ED PROVIDER NOTES
[facility] EMERGENCY DEPARTMENT  Emergency Department Encounter    Patient Name: Perla Mccarty  MRN: [id number]  YOB: 1965  Date of Evaluation: 5/10/2025  Provider: Evelin Fraga APRN - NP  Note Started: 6:17 PM EDT 5/10/25    CHIEF COMPLAINT  Hip Pain (Pt reports L hip pain x approx 1 week.  States she has had issues with b/l hips for several months, did PT in December 2024 and R hip is currently feeling better but L hip has sharp pains.  Difficulty ambulating d/t pain.  Able to bear weight and pivot during triage process.  Took aleve yesterday, minimal relief.)    SHARED SERVICE VISIT  Evaluated by AHSAN.  My supervising physician was available for consultation.     HISTORY OF PRESENT ILLNESS  Perla Mccarty is a 59 y.o. female who presents to the ED for evaluation of hip pain.  Patient reports history of bilateral hip discomfort.  Left over the past several days has been progressively worsening.  She denies trauma or injury although has had some increased activity.  Pain worse when attempting to sit and/or stand from seated position.  Also reports pain when moving and weightbearing.  Her discomfort does not appear to radiate.  She denies numbness, tingling, weakness of the extremity.    No other complaints, modifying factors or associated symptoms.     Nursing notes reviewed were all reviewed and agreed with or any disagreements were addressed in the HPI.    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

## 2025-05-14 ENCOUNTER — OFFICE VISIT (OUTPATIENT)
Dept: ORTHOPEDIC SURGERY | Age: 60
End: 2025-05-14

## 2025-05-14 VITALS — HEIGHT: 66 IN | WEIGHT: 216 LBS | BODY MASS INDEX: 34.72 KG/M2

## 2025-05-14 DIAGNOSIS — M54.16 LUMBAR RADICULITIS: ICD-10-CM

## 2025-05-14 DIAGNOSIS — M48.062 SPINAL STENOSIS OF LUMBAR REGION WITH NEUROGENIC CLAUDICATION: Primary | ICD-10-CM

## 2025-05-16 DIAGNOSIS — F40.240 CLAUSTROPHOBIA: ICD-10-CM

## 2025-05-16 DIAGNOSIS — M54.16 LUMBAR RADICULITIS: Primary | ICD-10-CM

## 2025-05-16 RX ORDER — LORAZEPAM 0.5 MG/1
TABLET ORAL
Qty: 2 TABLET | Refills: 0 | Status: SHIPPED | OUTPATIENT
Start: 2025-05-16 | End: 2025-05-23

## 2025-05-16 NOTE — TELEPHONE ENCOUNTER
----- Message from Christiano MAYERS sent at 5/16/2025  2:40 PM EDT -----  Regarding: Specialty Message to Provider-MEDICATION  Specialty Message to Provider    Relationship to Patient: Self     Patient Message: PATIENT NEEDING MEDICATION FOR HER MRI. USING [facility] IN [place]  --------------------------------------------------------------------------------------------------------------------------    Call Back Information: OK to leave message on voicemail  Preferred Call Back Number: Phone [phone]

## 2025-05-17 NOTE — PROGRESS NOTES
ORTHOPAEDIC SURGERY FOLLOWUP VISIT    CHIEF COMPLAINT: bilateral hip/leg pain    HISTORY OF PRESENT ILLNESS:  59-year-old female presents for evaluation of bilateral hip pain and leg pain.  This has onset over the last several months.  This is worsened gradually.  She indicates that this has become debilitating. She has difficulty with ambulating and getting out of a chair.  She reports pain when attempting to take a full stride.  She reports the left is more bothersome than the right at this time.  She reports occasional radiating pain into the lower leg.  She has trialed OTC medications without relief.  She indicates a forward flexed posture and leaning forward on a shopping cart alleviates her pain.  She has known history of spinal stenosis.She does not have groin pain.  She has not had prior injection.    PHYSICAL EXAM:  General: Well-appearing.  No distress.  Bilateral lower extremity exam: No cuts, open wounds, or abrasions.  No significant trochanteric tenderness.  No pain with logroll.  No pain with maximal hip flexion greater than 90 days.  No pain reproduced with extremes of ALMAZ and FADIR.  There is no tenderness to palpation involving the spinous processes of the lumbar spine.  No paraspinal muscular tenderness to palpation.  No reproducible dysesthesias with straight leg raise bilaterally.  She has some pain localizing to the buttock and low back with active straight leg raise.  Sensation is intact to light touch in deep peroneal, superficial peroneal, tibial, sural, and saphenous nerve distributions.  Motor function is intact to EHL, FHL, tibialis anterior, and gastroc.  There is brisk capillary refill to the toes and a strong palpable dorsalis pedis pulse.  Compartments are soft and compressible.  There is no calf tenderness and a negative Homans' sign.  Gait: Patient observed ambulating with a antalgic gait with shortened stride.  She has forward lean.    RADIOGRAPHIC EXAM:  AP pelvis as well as

## 2025-05-23 ENCOUNTER — RESULTS FOLLOW-UP (OUTPATIENT)
Dept: ORTHOPEDIC SURGERY | Age: 60
End: 2025-05-23

## 2025-05-23 ENCOUNTER — HOSPITAL ENCOUNTER (OUTPATIENT)
Dept: MRI IMAGING | Age: 60
Discharge: HOME | End: 2025-05-23
Attending: ORTHOPAEDIC SURGERY
Payer: COMMERCIAL

## 2025-05-23 DIAGNOSIS — M54.16 LUMBAR RADICULITIS: ICD-10-CM

## 2025-05-23 DIAGNOSIS — M48.062 SPINAL STENOSIS OF LUMBAR REGION WITH NEUROGENIC CLAUDICATION: ICD-10-CM

## 2025-05-23 DIAGNOSIS — M54.16 LUMBAR RADICULITIS: Primary | ICD-10-CM

## 2025-05-23 PROCEDURE — 72148 MRI LUMBAR SPINE W/O DYE: CPT

## 2025-05-27 NOTE — TELEPHONE ENCOUNTER
----- Message from Dr. Michael Shabazz MD sent at 5/23/2025  5:14 PM EDT -----  Recommend referral to [facility] related to spine findings.

## 2025-05-27 NOTE — TELEPHONE ENCOUNTER
Spoke with patient over the phone. Referral was sent to [facility]. Gave patient the phone number to call and schedule if office doesn't reach out after insurance authorization.

## 2025-07-10 ENCOUNTER — TRANSCRIBE ORDERS (OUTPATIENT)
Dept: ADMINISTRATIVE | Age: 60
End: 2025-07-10

## 2025-07-10 DIAGNOSIS — R79.89 ELEVATED LFTS: Primary | ICD-10-CM

## 2025-07-11 ENCOUNTER — HOSPITAL ENCOUNTER (OUTPATIENT)
Dept: ULTRASOUND IMAGING | Age: 60
Discharge: HOME | End: 2025-07-11
Payer: COMMERCIAL

## 2025-07-11 DIAGNOSIS — R79.89 ELEVATED LFTS: ICD-10-CM

## 2025-07-11 PROCEDURE — 76705 ECHO EXAM OF ABDOMEN: CPT

## (undated) DEVICE — SUTURE MCRYL SZ 4-0 L18IN ABSRB UD L19MM PS-2 3/8 CIR PRIM Y496G

## (undated) DEVICE — SOLUTION IRRIG 500ML 0.9% SOD CHLO USP POUR PLAS BTL

## (undated) DEVICE — SPONGE LAP W18XL18IN WHT COT 4 PLY FLD STRUNG RADPQ DISP ST 2 PER PACK

## (undated) DEVICE — CURITY ABDOMINAL PAD: Brand: CURITY

## (undated) DEVICE — ADHESIVE SKIN CLOSURE WND 8.661X1.5 IN 22 CM LIQUIBAND SECUR

## (undated) DEVICE — KIRSCHNER WIRE, WITH TROCAR TIP, Ø 2.0 M
Type: IMPLANTABLE DEVICE | Site: TIBIA | Status: NON-FUNCTIONAL
Brand: KIRSCHNER WIRE
Removed: 2023-08-03

## (undated) DEVICE — SET ADMIN PRIMING 7ML L30IN 7.35LB 20 GTT 2ND RLER CLMP

## (undated) DEVICE — Device

## (undated) DEVICE — IMPLANTABLE DEVICE
Type: IMPLANTABLE DEVICE | Site: TIBIA | Status: NON-FUNCTIONAL
Removed: 2023-08-03

## (undated) DEVICE — BIT DRL L100MM DIA2.7MM PROX TIB TI CALIB NONRADIOLUCENT

## (undated) DEVICE — SUTURE VCRL SZ 0 L27IN ABSRB UD L26MM CT-2 1/2 CIR J270H

## (undated) DEVICE — ELECTRODE PT RET AD L9FT HI MOIST COND ADH HYDRGEL CORDED

## (undated) DEVICE — 3M™ COBAN™ NL STERILE NON-LATEX SELF-ADHERENT WRAP, 2084S, 4 IN X 5 YD (10 CM X 4,5 M), 18 ROLLS/CASE: Brand: 3M™ COBAN™

## (undated) DEVICE — SUTURE VCRL SZ 2-0 L27IN ABSRB UD L26MM CT-2 1/2 CIR J269H

## (undated) DEVICE — GLOVE ORANGE PI 7 1/2   MSG9075

## (undated) DEVICE — DRAPE SURG W48XL52IN POLY U FEN REINF ADV ADH MATTE FINISH

## (undated) DEVICE — TUBE,ENDOTRACH,HVLP CUFF,MURPHY,7.0MM: Brand: MEDLINE

## (undated) DEVICE — STERILE POLYISOPRENE POWDER-FREE SURGICAL GLOVES WITH EMOLLIENT COATING: Brand: PROTEXIS

## (undated) DEVICE — GOWN SIRUS NONREIN XL W/TWL: Brand: MEDLINE INDUSTRIES, INC.

## (undated) DEVICE — 3M™ TEGADERM™ TRANSPARENT FILM DRESSING FRAME STYLE, 1624W, 2-3/8 IN X 2-3/4 IN (6 CM X 7 CM), 100/CT 4CT/CASE: Brand: 3M™ TEGADERM™

## (undated) DEVICE — DRAPE C ARM UNIV W41XL74IN CLR PLAS XR VELC CLSR POLY STRP